# Patient Record
Sex: FEMALE | Race: BLACK OR AFRICAN AMERICAN | Employment: UNEMPLOYED | URBAN - METROPOLITAN AREA
[De-identification: names, ages, dates, MRNs, and addresses within clinical notes are randomized per-mention and may not be internally consistent; named-entity substitution may affect disease eponyms.]

---

## 2017-01-19 ENCOUNTER — OFFICE VISIT (OUTPATIENT)
Dept: PAIN MANAGEMENT | Age: 67
End: 2017-01-19

## 2017-01-19 VITALS — SYSTOLIC BLOOD PRESSURE: 137 MMHG | HEART RATE: 64 BPM | DIASTOLIC BLOOD PRESSURE: 71 MMHG

## 2017-01-19 DIAGNOSIS — G89.4 CHRONIC PAIN SYNDROME: Primary | ICD-10-CM

## 2017-01-19 DIAGNOSIS — M79.2 NEURALGIA AND NEURITIS: ICD-10-CM

## 2017-01-19 DIAGNOSIS — G62.9 PERIPHERAL POLYNEUROPATHY: ICD-10-CM

## 2017-01-19 DIAGNOSIS — I73.9 PERIPHERAL VASCULAR DISEASE (HCC): ICD-10-CM

## 2017-01-19 NOTE — PROGRESS NOTES
[facility] for Pain Management  Interventional Pain Management Consultation History & Physical    PATIENT NAME:  Gray Torres     YOB: 1950    DATE OF SERVICE:   1/19/2017      CHIEF COMPLAINT:  Leg Pain; Knee Pain; Foot Pain; and Joint Pain      HISTORY OF PRESENT ILLNESS:   Sirisha Marie presents to the pain clinic today for initial evaluation and to consider interventional pain management options as indicated for the type and location of the pain the patient is presenting with. Sirisha Marie patient presents for evaluation and consideration for interventional procedures as indicated. She is referred to us by Dr. Zeyad Faustin for evaluation and consideration for spinal cord stimulator trial and placement. At today's evaluation patient complains of chronic bilateral leg pain as well as knee and bilateral foot pain. She relates that she has history of peripheral neuropathy. She relates that she has a history of peripheral vascular disease and has had peripheral vascular stents placed in the back of both of her legs. Patient currently endorses her pain as burning shooting tingling aching and nagging throbbing pain of both of her legs essentially from the knees down. Patient also is noted to have severe venous insufficiency. She has been tried on Lyrica and gabapentin without benefit. She gets some benefit from oxycodone. Her pain is currently continuous and character distribution and quality of pain as noted above. Nothing seems to either aggravate her pain although she says it is constant but changes in weather and activity do tend to make pain worse. Pain is only moderately relieved with medications, and otherwise her pain persist.  She also endorses numbness and tingling of the legs difficulty walking. In addition the patient's peripheral neuropathy pain is worse at night.   She has associated restless feelings in both of her feet.     Patient had nerve conduction studies done at [facility] June 17, 2016. This is a abnormal study suggestive of lumbar radiculopathy or plexopathy. This is actually nerve conduction study, EMG was recommended as follow-up. Cervical spine AP and lateral done May 26, 2016 show anterior cervical disc fusion C6-7 with minimal lucency around the C6 screw. Recommended complete cervical spine series to further evaluate this. MRI of the C-spine without contrast Aysha 10, 2009 with impression mild degenerative disc disease most notably C4-5, C5-6, C6-7. Mild bony stenosis the left neural foramen C4-5. We discussed options. Patient presents a long-standing history of bilateral leg pain with particular distribution of pain from the knees down into the feet bilaterally. She endorses aching burning throbbing shooting tingling pain of both legs and feet. Pain is increased with walking as well as cold weather. She has a history of peripheral vascular insufficiency and peripheral vascular disease for which peripheral vascular stents have been placed with no benefit. She has history of peripheral neuropathy refractory to gabapentin and Lyrica. She is maintained on opioid regimen at present for pain control. Nerve conduction studies suggest abnormal study of both legs. EMGs have been recommended but refused. Patient is referred for possible spinal cord stimulator trial and placement. She is not on anticoagulants, nor aspirin or other antiplatelets or phosphodiesterase inhibitors. I am in agreement with Dr. Brent Jett and that I believe the patient may benefit from spinal cord stimulator trial and placement. I have given the patient spinal cord stimulator DVD. I have invited the patient to the next spinal cord stimulator meeting informational meeting February 14 at noontime her at the pain clinic. I will place a pain psychology consult with Dr. Elba Kirkland today. The patient has no further questions, once I have received her pain psychology consult I will go ahead and schedule her spinal cord stimulator trial.  I discussed the procedure with the patient today including risk and benefits, indications contraindications and side effects the procedure. I have used skeleton spine model as well as  spinal cord stimulator device to demonstrate this. She has no further questions. She wishes to proceed with trial.    MRI: Discussed imaging using spinal cord stimulator device    PROCEDURES: Discussed spinal cord trial and placement    MRI Results (most recent):  No results found for this or any previous visit. PAST MEDICAL HISTORY:   The patient  has a past medical history of Hearing reduced; Hepatitis; and Hypothyroid. PAST SURGICAL HISTORY:   The patient  has a past surgical history that includes gyn and orthopaedic (). CURRENT MEDICATIONS:   The patient has a current medication list which includes the following prescription(s): ropinirole, clonazepam, oxycodone ir, triamcinolone acetonide, valacyclovir, pregabalin, levothyroxine sodium, duloxetine hcl, ergocalciferol (vitamin d2), vit b cmplx 3-fa-vit c-biotin, oxycodone hcl, methadone, and zolpidem. ALLERGIES:     Allergies   Allergen Reactions    Demerol [Meperidine] Itching and Swelling       FAMILY HISTORY:   The patient family history is not on file. SOCIAL HISTORY:   The patient  reports that she has never smoked. She has never used smokeless tobacco. The patient  reports that she does not drink alcohol. She also  has no drug history on file.     REVIEW OF SYSTEMS:   The patient denies fever, chills, weight loss (Constitutional), rash, itching (Skin), tinnitus, congestion (HENT), blurred vision, photophobia (Eyes), palpitations, orthopnea (Cardiovascular), hemoptysis, wheezing (Respiratory), nausea, vomiting, diarrhea (Gastrointestinal), dysuria, hematuria, urgency (Genitourinary), easy bruising, bleeding abnormalities (Hematologic), bowel or bladder incontinence, loss of consciousness (Neurologic), suicidal or homicidal ideation or hallucinations (Psychiatric). PHYSICAL EXAM:  VS:   Visit Vitals    /71 (BP 1 Location: Left arm, BP Patient Position: Sitting)    Pulse 64     General: Well-developed and well-nourished. Body habitus consistent with recorded height and weight and the calculated BMI. Apparent distress due to chronic bilateral leg and foot pain. Head: Normocephalic, atraumatic. Skin: Inspection of the skin reveals no rashes, lesions or infection. CV: Regular rate. No murmurs or rubs noted. No peripheral edema noted. Pulm: Respirations are even and unlabored. Extr: No clubbing, cyanosis, or edema noted. Musculoskeletal:  1. Cervical spine -decreased ROM. No paraspinous tenderness at any level. There is no scoliosis, asymmetry, or musculoskeletal defect. 2. Thoracic spine - Full ROM. No paraspinous tenderness at any level. There is no scoliosis, asymmetry, or musculoskeletal defect. 3. Lumbar spine -decreased range of motion all axes. No paraspinous tenderness at any level. SI joints are nontender bilaterally. There is no scoliosis, asymmetry, or musculoskeletal defect. 4. Right upper extremity - Full ROM. 5/5 muscle strength in all muscle groups. No pain or tenderness in shoulder, elbow, wrist, or hand. 5. Left upper extremity - Full ROM. 5/5 muscle strength in all muscle groups. No pain or tenderness in shoulder, elbow, wrist, or hand. 6. Right lower extremity - Full ROM. 5/5 muscle strength in all muscle groups. Pain to light palpation essentially from the knees down involving knees calves and feet. Distal pulses poor but palpable. No excessive mottling, discoloration or other trophic changes noted of the feet. Moderate atrophy of the intrinsic muscles of the foot bilateral .     7. Left lower extremity - Full ROM.   5/5 muscle strength in all muscle groups. Neurological:  1. Mental Status - Alert, awake and oriented. Speech is clear and appropriate. 2. Cranial Nerves - Extraocular muscles intact bilaterally. Cranial nerves II-XII grossly intact bilaterally. 3. Gait - antalgic   4. Provocative Tests - Spurlings negative bilaterally. Straight leg raise negative bilaterally. Psychological:  1. Mood and affect - Appropriate. 2. Speech - Appropriate. 3. Though content - Appropriate. 4. Judgment - Appropriate. ASSESSMENT:      ICD-10-CM ICD-9-CM    1. Chronic pain syndrome G89.4 338.4    2. Peripheral polyneuropathy G62.9 356.9    3. Neuralgia and neuritis M79.2 729.2    4. Peripheral vascular disease (HCC) I73.9 443.9            PLAN:  We discussed options. Patient presents a long-standing history of bilateral leg pain with particular distribution of pain from the knees down into the feet bilaterally. She endorses aching burning throbbing shooting tingling pain of both legs and feet. Pain is increased with walking as well as cold weather. She has a history of peripheral vascular insufficiency and peripheral vascular disease for which peripheral vascular stents have been placed with no benefit. She has history of peripheral neuropathy refractory to gabapentin and Lyrica. She is maintained on opioid regimen at present for pain control. Nerve conduction studies suggest abnormal study of both legs. EMGs have been recommended but refused. Patient is referred for possible spinal cord stimulator trial and placement. She is not on anticoagulants, nor aspirin or other antiplatelets or phosphodiesterase inhibitors. I am in agreement with Dr. Dxion Laureano and that I believe the patient may benefit from spinal cord stimulator trial and placement. I have given the patient spinal cord stimulator DVD. I have invited the patient to the next spinal cord stimulator meeting informational meeting February 14 at noontime her at the pain clinic.   I will place a pain psychology consult with Dr. Juan Srinivasan today. The patient has no further questions, once I have received her pain psychology consult I will go ahead and schedule her spinal cord stimulator trial.  I discussed the procedure with the patient today including risk and benefits, indications contraindications and side effects the procedure. I have used skeleton spine model as well as  spinal cord stimulator device to demonstrate this. She has no further questions. She wishes to proceed with trial.  1.    Diagnoses/Plan: Chronic pain syndrome, peripheral neuropathy, neuralgia and neuritis, peripheral vascular disease. 2.    I have thoroughly discussed the risks and benefits, side effects and complications, of any and all procedures that were mentioned at today's patient visit. I have used a skeleton model for added emphasis and patient education. I have answered all questions, and I have obtained verbal confirmation for all procedures planned with the patient. 3.    I have reviewed in great detail today the patient's MRI and other imaging studies with the patient. I have explained to the patient their condition using both actual recent and relevant images. I have used a skeleton model for added emphasis as well as patient education. 4.    I have advised patient to have a primary care provider to continue care for health maintenance and general medical conditions and support for referral to specialty care as needed. 5.    I have reviewed with patient the treatment plan, goals of treatment plan, and limitations of treatment plan, to include the potential for side effects from medications and procedures. If side effects occur, it is the responsibility of the patient to inform the clinic so that a change in the treatment plan can be made in a safe manner.  The patient is advised that stopping prescribed medication may cause an increase in symptoms and possible medication withdrawal symptoms. The patient is informed an emergency room evaluation may be necessary if this occurs. DISPOSITION:   The patients condition and plan were discussed at length and all questions were answered. The patient agrees with the plan. A total of 40 minutes was spent with the patient of which over half of the time was spent counseling the patient. Josué Sweet MD 1/19/2017 2:10 PM    Note: Although these clinic notes were documented by the provider at the time of the exam, they have not been proofed and are subject to transcription variance.

## 2017-01-19 NOTE — MR AVS SNAPSHOT
Visit Information Date & Time Provider Department Dept. Phone Encounter #  
 1/19/2017  1:00 PM Sivan Chairez MD [facility] [phone] Your Appointments 2/3/2017  1:00 PM  
Office Visit with CFPM EDUC CLASS [facility] (STEFANIE SCHEDULING) Appt Note: new pt edu class; new pt edu class 30 [facility] [address]  
[phone]  
  
    
 2/3/2017  2:40 PM  
Follow Up with Ab Christie PA-C [facility] (STEFANIE SCHEDULING) Appt Note: Return in about 2 months (around 2/9/2017). ; Return in about 2 months (around 2/9/2017). 30 [facility] [address]  
[phone] [address] [place] [address] Upcoming Health Maintenance Date Due DTaP/Tdap/Td series (1 - Tdap) 9/25/1972 BREAST CANCER SCRN MAMMOGRAM 9/25/2001 ZOSTER VACCINE AGE 60> 9/25/2011 INFLUENZA AGE 9 TO ADULT 8/1/2016 GLAUCOMA SCREENING Q2Y 9/25/2016 Pneumococcal 65+ Low/Medium Risk (1 of 2 - PCV13) 9/25/2016 MEDICARE YEARLY EXAM 9/25/2016 COLONOSCOPY 11/7/2021 Allergies as of 1/19/2017  Review Complete On: 1/04/5984 By: Araceli Gallo LPN Severity Noted Reaction Type Reactions Demerol [Meperidine]  08/01/2012    Itching, Swelling Current Immunizations  Never Reviewed No immunizations on file. Not reviewed this visit Vitals BP Pulse OB Status Smoking Status 137/71 (BP 1 Location: Left arm, BP Patient Position: Sitting) 64 Hysterectomy Never Smoker Vitals History Preferred Pharmacy Pharmacy Name Phone 1133 [facility], [address] [address] [phone] Your Updated Medication List  
  
   
This list is accurate as of: 1/19/17  1:38 PM.  Always use your most recent med list.  
  
  
  
  
 AMBIEN 10 mg tablet Generic drug:  zolpidem Take  by mouth nightly as needed. CYMBALTA PO Take  by mouth. KlonoPIN 1 mg tablet Generic drug:  clonazePAM  
Take  by mouth two (2) times a day. LYRICA PO Take  by mouth.  
  
 methadone 10 mg tablet Commonly known as:  DOLOPHINE Take  by mouth every four (4) hours as needed. * OXYCODONE PO Take  by mouth. * oxyCODONE IR 30 mg immediate release tablet Commonly known as:  OXY-IR Take 1 Tab by mouth three (3) times daily as needed for Pain for up to 30 days. Max Daily Amount: 90 mg. Indications: PAIN  
  
 rOPINIRole 1 mg tablet Commonly known as:  [address] Take  by mouth three (3) times daily. SYNTHROID PO Take  by mouth.  
  
 triamcinolone acetonide 0.1 % topical cream  
Commonly known as:  KENALOG Apply  to affected area four (4) times daily as needed for Skin Irritation. use thin layer  
  
 valACYclovir 1 gram tablet Commonly known as:  VALTREX Take 1 Tab by mouth daily. vit B Cmplx 3-FA-Vit C-Biotin 1- mg-mg-mcg tablet Commonly known as:  NEPHRO FRANKY RX Take 1 Tab by mouth daily. VITAMIN D2 PO Take  by mouth. * Notice: This list has 2 medication(s) that are the same as other medications prescribed for you. Read the directions carefully, and ask your doctor or other care provider to review them with you. Introducing [facility] & HEALTH SERVICES! [employer] introduces [employer] patient portal. Now you can access parts of your medical record, email your doctor's office, and request medication refills online. 1. In your internet browser, go to [email or web address] 2. Click on the First Time User? Click Here link in the Sign In box. You will see the New Member Sign Up page. 3. Enter your [employer] Access Code exactly as it appears below. You will not need to use this code after youve completed the sign-up process.  If you do not sign up before the expiration date, you must request a new code. · [employer] Access Code: [email or web address] Expires: 3/7/2017  3:47 PM 
 
4. Enter the last four digits of your Social Security Number (xxxx) and Date of Birth (mm/dd/yyyy) as indicated and click Submit. You will be taken to the next sign-up page. 5. Create a [employer] ID. This will be your Sym[employer] login ID and cannot be changed, so think of one that is secure and easy to remember. 6. Create a [employer] password. You can change your password at any time. 7. Enter your Password Reset Question and Answer. This can be used at a later time if you forget your password. 8. Enter your e-mail address. You will receive e-mail notification when new information is available in [address] 9. Click Sign Up. You can now view and download portions of your medical record. 10. Click the Download Summary menu link to download a portable copy of your medical information. If you have questions, please visit the Frequently Asked Questions section of the [employer] website. Remember, [employer] is NOT to be used for urgent needs. For medical emergencies, dial 911. Now available from your iPhone and Android! Please provide this summary of care documentation to your next provider. Your primary care clinician is listed as Tu Hirsch. If you have any questions after today's visit, please call [phone].

## 2017-02-03 ENCOUNTER — OFFICE VISIT (OUTPATIENT)
Dept: PAIN MANAGEMENT | Age: 67
End: 2017-02-03

## 2017-02-03 VITALS — BODY MASS INDEX: 28.84 KG/M2 | WEIGHT: 168 LBS

## 2017-02-03 DIAGNOSIS — Z98.890 STATUS POST CARPAL TUNNEL RELEASE: ICD-10-CM

## 2017-02-03 DIAGNOSIS — M79.2 NEURALGIA AND NEURITIS: ICD-10-CM

## 2017-02-03 DIAGNOSIS — Z90.710 S/P TOTAL ABDOMINAL HYSTERECTOMY: ICD-10-CM

## 2017-02-03 DIAGNOSIS — G89.4 CHRONIC PAIN SYNDROME: ICD-10-CM

## 2017-02-03 DIAGNOSIS — I73.9 PERIPHERAL VASCULAR DISEASE (HCC): ICD-10-CM

## 2017-02-03 DIAGNOSIS — G62.9 PERIPHERAL POLYNEUROPATHY: Primary | ICD-10-CM

## 2017-02-03 RX ORDER — OXYCODONE HYDROCHLORIDE 30 MG/1
30 TABLET ORAL
Qty: 90 TAB | Refills: 0 | Status: SHIPPED | OUTPATIENT
Start: 2017-03-09 | End: 2017-03-31 | Stop reason: SDUPTHER

## 2017-02-03 RX ORDER — OXYCODONE HYDROCHLORIDE 30 MG/1
30 TABLET ORAL
Qty: 90 TAB | Refills: 0 | Status: SHIPPED | OUTPATIENT
Start: 2017-02-08 | End: 2017-02-03 | Stop reason: SDUPTHER

## 2017-02-03 NOTE — PATIENT INSTRUCTIONS
Plan:  Continue same medications as prescribed for chronic pain  Do not take any medications not prescribed to you for any reason  Report all controlled medications that are prescribed by other prescribers  Follow up in 2 months or sooner if needed  Regular exercise and attention to emotional health and diet remain the most effective ways to treat chronic pain of all kinds  You may contact me with questions or concerns through [address]

## 2017-02-03 NOTE — MR AVS SNAPSHOT
Visit Information Date & Time Provider Department Dept. Phone Encounter #  
 2/3/2017  2:40 PM Odalis Meeksani [address] for [facility] Management [phone] [id number] Follow-up Instructions Return in about 2 months (around 4/3/2017). Follow-up and Disposition History Your Appointments 2/3/2017  2:40 PM  
Follow Up with Gonzalez Yao PA-C [address] [facility] (STEFANIE SCHEDULING) Appt Note: Return in about 2 months (around 2/9/2017). ; Return in about 2 months (around 2/9/2017). 30 [facility] [address]  
[phone]  [id number]  
  
    
 2/9/2017  1:40 PM  
Follow Up with Gonzalez Yao PA-C [address] for [facility] (STEFANIE SCHEDULING) Appt Note: FOLLOW UP  
 30 [facility] [address]  
[phone] Upcoming Health Maintenance Date Due DTaP/Tdap/Td series (1 - Tdap) 9/25/1972 BREAST CANCER SCRN MAMMOGRAM 9/25/2001 ZOSTER VACCINE AGE 60> 9/25/2011 INFLUENZA AGE 9 TO ADULT 8/1/2016 GLAUCOMA SCREENING Q2Y 9/25/2016 Pneumococcal 65+ Low/Medium Risk (1 of 2 - PCV13) 9/25/2016 MEDICARE YEARLY EXAM 9/25/2016 COLONOSCOPY 11/7/2021 Allergies as of 2/3/2017  Review Complete On: 2/3/2017 By: Edi Dawn LPN Severity Noted Reaction Type Reactions Demerol [Meperidine]  08/01/2012    Itching, Swelling Current Immunizations  Never Reviewed No immunizations on file. Not reviewed this visit You Were Diagnosed With   
  
 Codes Comments Peripheral polyneuropathy    -  Primary ICD-10-CM: G62.9 ICD-9-CM: 356.9 Neuralgia and neuritis     ICD-10-CM: M79.2 ICD-9-CM: 729.2 S/P total abdominal hysterectomy     ICD-10-CM: Z90.710 ICD-9-CM: V88.01 Status post carpal tunnel release     ICD-10-CM: A88.647 ICD-9-CM: V45.89   
 Peripheral vascular disease ([facility] [address]     ICD-10-CM: I73.9 ICD-9-CM: 443. 9 Chronic pain syndrome     ICD-10-CM: G89.4 ICD-9-CM: 338. 4 Vitals Weight(growth percentile) BMI OB Status Smoking Status 168 lb (76.2 kg) 28.84 kg/m2 Hysterectomy Never Smoker BMI and BSA Data Body Mass Index Body Surface Area  
 28.84 kg/m 2 1.85 m 2 Preferred Pharmacy Pharmacy Name Phone [address] [phone] Your Updated Medication List  
  
   
This list is accurate as of: 2/3/17  1:52 PM.  Always use your most recent med list.  
  
  
  
  
 AMBIEN 10 mg tablet Generic drug:  zolpidem Take  by mouth nightly as needed. CYMBALTA PO Take  by mouth. KlonoPIN 1 mg tablet Generic drug:  clonazePAM  
Take  by mouth two (2) times a day. LYRICA PO Take  by mouth.  
  
 methadone 10 mg tablet Commonly known as:  DOLOPHINE Take  by mouth every four (4) hours as needed. * OXYCODONE PO Take  by mouth. * oxyCODONE IR 30 mg immediate release tablet Commonly known as:  OXY-IR Take 1 Tab by mouth three (3) times daily as needed for Pain for up to 30 days. Max Daily Amount: 90 mg. Indications: Pain Start taking on:  3/9/2017  
  
 rOPINIRole 1 mg tablet Commonly known as:  [place] [address] Take  by mouth three (3) times daily. SYNTHROID PO Take  by mouth.  
  
 triamcinolone acetonide 0.1 % topical cream  
Commonly known as:  KENALOG Apply  to affected area four (4) times daily as needed for Skin Irritation. use thin layer  
  
 valACYclovir 1 gram tablet Commonly known as:  VALTREX Take 1 Tab by mouth daily. vit B Cmplx 3-FA-Vit C-Biotin 1- mg-mg-mcg tablet Commonly known as:  NEPHRO FRANKY RX Take 1 Tab by mouth daily. VITAMIN D2 PO Take  by mouth. * Notice:   This list has 2 medication(s) that are the same as other medications prescribed for you. Read the directions carefully, and ask your doctor or other care provider to review them with you. Prescriptions Printed Refills  
 oxyCODONE IR (OXY-IR) 30 mg immediate release tablet 0 Starting on: 3/9/2017 Sig: Take 1 Tab by mouth three (3) times daily as needed for Pain for up to 30 days. Max Daily Amount: 90 mg. Indications: Pain Class: Print Route: Oral  
  
Follow-up Instructions Return in about 2 months (around 4/3/2017). Patient Instructions Plan: 
Continue same medications as prescribed for chronic pain Do not take any medications not prescribed to you for any reason Report all controlled medications that are prescribed by other prescribers Follow up in 2 months or sooner if needed Regular exercise and attention to emotional health and diet remain the most effective ways to treat chronic pain of all kinds You may contact me with questions or concerns through [address] Introducing hospitals & [facility] SERVICES! [facility] introduces [employer] patient portal. Now you can access parts of your medical record, email your doctor's office, and request medication refills online. 1. In your internet browser, go to [email or web address] 2. Click on the First Time User? Click Here link in the Sign In box. You will see the New Member Sign Up page. 3. Enter your [employer] Access Code exactly as it appears below. You will not need to use this code after youve completed the sign-up process. If you do not sign up before the expiration date, you must request a new code. · [employer] Access Code: [email or web address] Expires: 3/7/2017  3:47 PM 
 
4. Enter the last four digits of your Social Security Number (xxxx) and Date of Birth (mm/dd/yyyy) as indicated and click Submit. You will be taken to the next sign-up page. 5. Create a [employer] ID.  This will be your [employer] login ID and cannot be changed, so think of one that is secure and easy to remember. 6. Create a [employer] password. You can change your password at any time. 7. Enter your Password Reset Question and Answer. This can be used at a later time if you forget your password. 8. Enter your e-mail address. You will receive e-mail notification when new information is available in [address] 9. Click Sign Up. You can now view and download portions of your medical record. 10. Click the Download Summary menu link to download a portable copy of your medical information. If you have questions, please visit the Frequently Asked Questions section of the [employer] website. Remember, [employer] is NOT to be used for urgent needs. For medical emergencies, dial 911. Now available from your iPhone and Android! Please provide this summary of care documentation to your next provider. Your primary care clinician is listed as Tu Hirsch. If you have any questions after today's visit, please call [phone].

## 2017-02-03 NOTE — PROGRESS NOTES
Nursing Notes    Patient presents to the office today in follow-up. Patient rates her pain at 3/10 on the numerical pain scale. Reviewed medications with counts as follows:    Rx Date filled Qty Dispensed Pill Count Last Dose Short   Oxycodone 30mg 01/10/17 90 31 Today  No                                             Comments:     POC UDS was not performed in office today    Any new labs or imaging since last appointment? YES; cxr     Have you been to an emergency room (ER) or urgent care clinic since your last visit? NO            Have you been hospitalized since your last visit? NO     If yes, where, when, and reason for visit? Have you seen or consulted any other health care providers outside of the [address] [place] [address]  since your last visit? YES     If yes, where, when, and reason for visit? Orthopedist     HM deferred to pcp.

## 2017-02-03 NOTE — PROGRESS NOTES
HISTORY OF PRESENT ILLNESS  Sirisha Marie is a 73 y.o. female. Patient presents for follow up of chronic, severe pain which is widespread and multifocal and includes peripheral neuropathic pain and polyarthralgias. She also suffers with chronic neck pain due to cervical DDD and postlaminectomy syndrome. She reports that pain continues to predominate in the knees, lower legs, and feet. Knee pain is described as sharp, stiff, and aching. Pain worsens with rising from a seated position and with prolonged walking. She received cortisone injections yesterday, but has seen no improvement yet. Neuropathy pain is described as shooting, tingling, and burning. This pain also worsens with prolonged walking, and with prolonged sitting (particularly in the evenings). Peripheral arterial disease worsens this pain, and she also notes cramping in the calves with prolonged walking. She reports that she has spoken with a vascular surgeon who has recommended bypass grafting if claudication symptoms worsen. She has been taking Oxycodone 30 mg TID for over two years, which she states is the most effective and best tolerated of anything she has tried. Constipation is problematic at times, but is well managed with daily miralax and sennakot. Sirisha Marie is able to stay more active with less discomfort with these current doses. The patient reports an average of 80 % relief with this regimen. Most recent  were consistent with prescribed medications. However, UDS showed a small amount of Codeine and Norfentanyl metabolites. She denies taking either of these medications. We will perform a repeat UDS today. Pill counts are appropriate. She is informed of side effects, risks, and benefits of this regimen, and emphasizes that she derives a significant improvement in functionality and quality of life, and notes that non-opioid medications and therapies in the past have not offered significant benefit.    She denies new or worsening insomnia or constipation issues. She denies any falls, injuries, or hospitalizations since the last visit. She uses Ambien for sleep, and reports that this has been effective and well tolerated. Klonopine 0.5 mg is prescribed for anxiety by her psychiatrist, and she reports that she has discussed risks with her. We discussed the risks of disordered breathing and overdose with combining benzos with opioids, particularly at her high doses. She accepts these risks, noting that her functionality and quality of life has improved with this regimen. A total of 45 minutes was spent with the patient of which more than 50% of the time was spent counseling the patient. HPI--see above    ROS  Constitutional: Positive for malaise/fatigue. Gastrointestinal: Positive for constipation, heartburn and nausea. Musculoskeletal: Positive for back pain, joint pain (polyarticular), myalgias and neck pain. Neurological: Positive for weakness (generalized). Psychiatric/Behavioral: The patient has insomnia. All other systems reviewed and are negative. Physical Exam  Constitutional: She is oriented to person, place, and time. She appears cachectic. She has a sickly appearance. She appears distressed. HENT:   Head: Normocephalic and atraumatic. Right Ear: External ear normal.   Left Ear: External ear normal.   Nose: Nose normal.   Mouth/Throat: Oropharynx is clear and moist. No oropharyngeal exudate. Eyes: Conjunctivae and EOM are normal. Pupils are equal, round, and reactive to light. Right eye exhibits no discharge. Left eye exhibits no discharge. No scleral icterus. Neck: Muscular tenderness: spasm. Decreased range of motion present. No Brudzinski's sign and no Kernig's sign noted. No thyromegaly present. Cardiovascular: Normal rate, regular rhythm and normal heart sounds. Pulmonary/Chest: Effort normal and breath sounds normal. No respiratory distress. She has no wheezes. She has no rales.    Abdominal: Soft. She exhibits no distension. There is no tenderness. There is no rebound and no guarding. Musculoskeletal: She exhibits tenderness. Right shoulder: She exhibits decreased range of motion and tenderness. Left shoulder: She exhibits decreased range of motion and tenderness. Right elbow: She exhibits decreased range of motion. Tenderness found. Left elbow: She exhibits decreased range of motion. Tenderness found. Right wrist: She exhibits decreased range of motion and tenderness. Left wrist: She exhibits decreased range of motion and tenderness. Right hip: She exhibits decreased range of motion and tenderness. Left hip: She exhibits decreased range of motion and tenderness. Right knee: She exhibits decreased range of motion. Tenderness found. Left knee: She exhibits decreased range of motion. Tenderness found. Right ankle: She exhibits decreased range of motion. Tenderness. Left ankle: She exhibits decreased range of motion. Tenderness. Lumbar back: She exhibits decreased range of motion, tenderness, pain and spasm. Neurological: She is alert and oriented to person, place, and time. She has normal reflexes. No cranial nerve deficit or sensory deficit. She exhibits normal muscle tone. Gait abnormal. Coordination normal.   Reflex Scores:       Tricep reflexes are 2+ on the right side and 2+ on the left side. Bicep reflexes are 2+ on the right side and 2+ on the left side. Brachioradialis reflexes are 2+ on the right side and 2+ on the left side. Patellar reflexes are 2+ on the right side and 2+ on the left side. Achilles reflexes are 2+ on the right side and 2+ on the left side. Skin: Skin is warm. No rash noted. Psychiatric: Her speech is normal and behavior is normal. Judgment and thought content normal. She exhibits a depressed mood. ASSESSMENT and PLAN    ICD-10-CM ICD-9-CM    1.  Peripheral polyneuropathy G62.9 356.9    2. Neuralgia and neuritis M79.2 729.2    3. S/P total abdominal hysterectomy-1993 Z90.710 V88.01    4. Status post carpal tunnel release-bilaterally Z98.890 V45.89    5. Peripheral vascular disease (HCC) I73.9 443.9    6.  Chronic pain syndrome G89.4 338.4       Plan:  Continue same medications as prescribed for chronic pain  Do not take any medications not prescribed to you for any reason  Report all controlled medications that are prescribed by other prescribers  Follow up in 2 months or sooner if needed  Regular exercise and attention to emotional health and diet remain the most effective ways to treat chronic pain of all kinds  You may contact me with questions or concerns through [employer]

## 2017-02-22 ENCOUNTER — TELEPHONE (OUTPATIENT)
Dept: PAIN MANAGEMENT | Age: 67
End: 2017-02-22

## 2017-02-22 NOTE — TELEPHONE ENCOUNTER
Called patient to [facility] base after hearing from Dr. Jasper Collins office that patient missed her appointment. Patient stated that she believed she had cancelled the appointment because she was told the device that was being offered was strictly for her back pain and she needs help with her neck pain. Patient states that she has a surgery scheduled with another doctor for march 21st  To correct the cause of her neck pain. Spent 25 minutes explaining to patient the purpose and the mechanics of the SCS. Patient stated that no one has taken the time to explain the procedure or device to her before now and now she feels she wants to reconsider getting the device. Patient was urged to attended the SCS class being held in our office on 3/15/2017 @ 12pm, it was also stated to patient that if she would like to bring a family member or friend for moral support to feel free to do so, in order to get all of her questions answered and be well informed on what it is that we are offering her. Patient stated that she would like to cancel her upcoming surgery and attend the class. Advised patient to hold off on cancelling her surgery until after she attends the class and decides if the SCS is something she wishes to continue with. Direct contact information provided. Patient thanked me and call was ended.

## 2017-03-01 ENCOUNTER — HOSPITAL ENCOUNTER (OUTPATIENT)
Dept: PREADMISSION TESTING | Age: 67
Discharge: HOME | End: 2017-03-01
Payer: COMMERCIAL

## 2017-03-01 DIAGNOSIS — M50.121 DISORDER OF INTERVERTEBRAL DISC AT C4-C5 LEVEL WITH RADICULOPATHY: ICD-10-CM

## 2017-03-01 LAB
ANION GAP BLD CALC-SCNC: 6 MMOL/L (ref 3–18)
ATRIAL RATE: 61 BPM
BACTERIA SPEC CULT: NORMAL
BUN SERPL-MCNC: 12 MG/DL (ref 7–18)
BUN/CREAT SERPL: 15 (ref 12–20)
CALCIUM SERPL-MCNC: 9.2 MG/DL (ref 8.5–10.1)
CALCULATED P AXIS, ECG09: 28 DEGREES
CALCULATED R AXIS, ECG10: 63 DEGREES
CALCULATED T AXIS, ECG11: 62 DEGREES
CHLORIDE SERPL-SCNC: 104 MMOL/L (ref 100–108)
CO2 SERPL-SCNC: 31 MMOL/L (ref 21–32)
CREAT SERPL-MCNC: 0.8 MG/DL (ref 0.6–1.3)
DIAGNOSIS, 93000: NORMAL
ERYTHROCYTE [DISTWIDTH] IN BLOOD BY AUTOMATED COUNT: 12.4 % (ref 11.6–14.5)
GLUCOSE SERPL-MCNC: 99 MG/DL (ref 74–99)
HCT VFR BLD AUTO: 42.6 % (ref 35–45)
HGB BLD-MCNC: 14.5 G/DL (ref 12–16)
MCH RBC QN AUTO: 32.6 PG (ref 24–34)
MCHC RBC AUTO-ENTMCNC: 34 G/DL (ref 31–37)
MCV RBC AUTO: 95.7 FL (ref 74–97)
P-R INTERVAL, ECG05: 116 MS
PLATELET # BLD AUTO: 167 K/UL (ref 135–420)
PMV BLD AUTO: 10.1 FL (ref 9.2–11.8)
POTASSIUM SERPL-SCNC: 4.5 MMOL/L (ref 3.5–5.5)
Q-T INTERVAL, ECG07: 424 MS
QRS DURATION, ECG06: 86 MS
QTC CALCULATION (BEZET), ECG08: 426 MS
RBC # BLD AUTO: 4.45 M/UL (ref 4.2–5.3)
SERVICE CMNT-IMP: NORMAL
SODIUM SERPL-SCNC: 141 MMOL/L (ref 136–145)
VENTRICULAR RATE, ECG03: 61 BPM
WBC # BLD AUTO: 7.5 K/UL (ref 4.6–13.2)

## 2017-03-01 PROCEDURE — 87641 MR-STAPH DNA AMP PROBE: CPT | Performed by: ORTHOPAEDIC SURGERY

## 2017-03-01 PROCEDURE — 36415 COLL VENOUS BLD VENIPUNCTURE: CPT | Performed by: ORTHOPAEDIC SURGERY

## 2017-03-01 PROCEDURE — 85027 COMPLETE CBC AUTOMATED: CPT | Performed by: ORTHOPAEDIC SURGERY

## 2017-03-01 PROCEDURE — 93005 ELECTROCARDIOGRAM TRACING: CPT

## 2017-03-01 PROCEDURE — 80048 BASIC METABOLIC PNL TOTAL CA: CPT | Performed by: ORTHOPAEDIC SURGERY

## 2017-03-15 PROBLEM — M48.02 CERVICAL SPINAL STENOSIS: Status: ACTIVE | Noted: 2017-03-15

## 2017-03-15 PROBLEM — M50.30 DDD (DEGENERATIVE DISC DISEASE), CERVICAL: Status: ACTIVE | Noted: 2017-03-15

## 2017-03-15 PROBLEM — M50.20 HNP (HERNIATED NUCLEUS PULPOSUS), CERVICAL: Status: ACTIVE | Noted: 2017-03-15

## 2017-03-16 NOTE — H&P
Patient Name:   Gigi Gonzalez  YOB: 1950      Chief Complaint:  Neck pain. History of Chief Complaint:  Ms. Amanda Carreon is a 73 y.o female being seen for neck pain. She says her neck pain seems to be getting worse. There seems to be soreness across her upper neck and into her shoulders. She has no weakness and no bowel or bladder dysfunction. Bending her neck backward causes pain. Past Medical/Surgical History:    Disease/Disorder Type Date Side Surgery Date Side Comment   Depression          Osteoarthritis          Osteoporosis          Thyroid disease          Hepatitis C          Schambergs disease              Hysterectomy, total   AMD 11/06/2014 -       Spinal fusion, cervical 06/2009 C6-7        Carpal tunnel release 2005 right        Carpal tunnel release 2006 left    Peripheral neuropathy    Leg stents 2012 bilateral      Allergies:    Ingredient Reaction Medication Name Comment   Demerol  Incivek          Current Medications:    Medication Directions   Synthroid    zolpidem 10 mg tablet    valacyclovir 1 g tablet    triamcinolone acetonide 0.1 % topical cream      Social History:      SMOKING  Status Tobacco Type Units Per Day Yrs Used   Never smoker        ALCOHOL  There is no history of alcohol use. Family History:    Disease Detail Family Member Age Cause of Death Comments   Family history of Arthritis   N    Renal disease Mother  N    Stroke Mother  N    Renal disease Father  Y    Family history of Thyroid disorder   N    Family history of Cardiovascular disease   N    Diabetes mellitus Mother  N    Hypertension Mother  N    Myocardial infarction Brother  N      Vitals:  Date BP Pulse Temp (F) Resp. (per min.) Height (Total in.) Weight (lbs.) BMI   05/12/2015 130/76 66   65.00  29.12   10/30/2014 126/70 73   64.00  27.98   06/16/2014 128/73 90   64.00  27.98     Physical Examination:    General:  The patient appears healthy.   Cardiovascular:  Arterial pulses are normal.  Skin:  The skin is normal appearing with no contusions or wounds noted. Heart- RRR  Lungs-CTA alta  Abdomen- +BS,soft,nontender  Musculoskeletal:  There is tenderness of the paravertebral spinal muscles. Otherwise, the cervical spine has a normal appearance and no spasm of the paracervical muscle. There is no tenderness on palpation of the trapezius muscle. Flexion, extension, and right and left rotation of the cervical spine are normal.  Examination of the shoulder shows no warmth, no deformity, and no muscle atrophy. There is no tenderness on palpation of the subacromial bursa, the glenohumeral joint region, or the bicipital groove. Range of motion of the shoulder is normal in abduction, forward flexion, extension, and in internal and external rotation. No pain is elicited by motion of the shoulder or by impingement testing. No instability of the shoulder is noted. Neurological:  Sensory and motor examination of the cervical spine elicited no tactile dysesthesia or hyperesthesia and demonstrated normal motor strength of the upper extremities. Shoulder strength is normal in flexion, abduction, adduction, and internal rotation. Reflexes and peripheral nerves are intact. Normal reflexes are present in the biceps, brachioradialis, and triceps. There is no weakness in the fingers. Gait and stance are normal.  Knee and ankle jerks are normal with no clonus. Radiograph Examination: AP, lateral, bilateral oblique, flexion and extension views of the cervical spine were obtained and interpreted in the office 1/26/17and reveal excellent alignment of the cervical spine hardware and anatomy. Review of her MRI scan  [facility] 2/6/17 reveals C4-5 and C5-6 disc herniation and spinal stenosis.     Impression:  Ms. Safia Almendarez and I discussed treatments for her cervical spinal stenosis, degenerative disc disease, and disc herniation including surgical intervention, the risks, and benefits as well as the different surgical approaches and decision making. We also discussed nonsurgical care for this condition including medications, injections, physical therapy, rehabilitation, activity modification, and brace utilization. At this point, she would like to proceed with operative intervention. We will plan for C4-5 and C5-6 ACDF  with Zero Profile. The risks versus the benefits as well as the alternatives were fully explained to the patient. Risks include, but are not limited to, paralysis, death, heart attack, stroke, pulmonary embolism, deep vein thrombosis, infection, failure to relieve pain, increase in back or arm pain, reherniation, scarring, spinal fluid leak, bowel or bladder dysfunction, bleeding, disease transmission, instrumentation failure, pseudarthrosis, difficulty swallowing, and need for revision surgery. The patient states full understanding of the risks and benefits and wishes to proceed.

## 2017-03-20 ENCOUNTER — ANESTHESIA EVENT (OUTPATIENT)
Dept: SURGERY | Age: 67
DRG: 473 | End: 2017-03-20
Payer: COMMERCIAL

## 2017-03-21 ENCOUNTER — SURGERY (OUTPATIENT)
Age: 67
End: 2017-03-21

## 2017-03-21 ENCOUNTER — HOSPITAL ENCOUNTER (INPATIENT)
Age: 67
LOS: 1 days | Discharge: HOME HEALTH | DRG: 473 | End: 2017-03-22
Attending: ORTHOPAEDIC SURGERY | Admitting: ORTHOPAEDIC SURGERY
Payer: COMMERCIAL

## 2017-03-21 ENCOUNTER — APPOINTMENT (OUTPATIENT)
Dept: GENERAL RADIOLOGY | Age: 67
DRG: 473 | End: 2017-03-21
Attending: ORTHOPAEDIC SURGERY
Payer: COMMERCIAL

## 2017-03-21 ENCOUNTER — ANESTHESIA (OUTPATIENT)
Dept: SURGERY | Age: 67
DRG: 473 | End: 2017-03-21
Payer: COMMERCIAL

## 2017-03-21 PROCEDURE — 77030013079 HC BLNKT BAIR HGGR 3M -A: Performed by: ANESTHESIOLOGY

## 2017-03-21 PROCEDURE — 76010000153 HC OR TIME 1.5 TO 2 HR: Performed by: ORTHOPAEDIC SURGERY

## 2017-03-21 PROCEDURE — 77030003029 HC SUT VCRL J&J -B: Performed by: ORTHOPAEDIC SURGERY

## 2017-03-21 PROCEDURE — 76210000016 HC OR PH I REC 1 TO 1.5 HR: Performed by: ORTHOPAEDIC SURGERY

## 2017-03-21 PROCEDURE — 77030003666 HC NDL SPINAL BD -A: Performed by: ORTHOPAEDIC SURGERY

## 2017-03-21 PROCEDURE — 77030016293 HC SPCR SPN ZEROP SYNT -I2: Performed by: ORTHOPAEDIC SURGERY

## 2017-03-21 PROCEDURE — 77030018836 HC SOL IRR NACL ICUM -A: Performed by: ORTHOPAEDIC SURGERY

## 2017-03-21 PROCEDURE — 74011250636 HC RX REV CODE- 250/636: Performed by: ANESTHESIOLOGY

## 2017-03-21 PROCEDURE — 77030013567 HC DRN WND RESERV BARD -A: Performed by: ORTHOPAEDIC SURGERY

## 2017-03-21 PROCEDURE — 77030002986 HC SUT PROL J&J -A: Performed by: ORTHOPAEDIC SURGERY

## 2017-03-21 PROCEDURE — 97161 PT EVAL LOW COMPLEX 20 MIN: CPT

## 2017-03-21 PROCEDURE — 74011250636 HC RX REV CODE- 250/636: Performed by: PHYSICIAN ASSISTANT

## 2017-03-21 PROCEDURE — 77030032490 HC SLV COMPR SCD KNE COVD -B: Performed by: ORTHOPAEDIC SURGERY

## 2017-03-21 PROCEDURE — 77030010507 HC ADH SKN DERMBND J&J -B: Performed by: ORTHOPAEDIC SURGERY

## 2017-03-21 PROCEDURE — 0RG20A0 FUSION OF 2 OR MORE CERVICAL VERTEBRAL JOINTS WITH INTERBODY FUSION DEVICE, ANTERIOR APPROACH, ANTERIOR COLUMN, OPEN APPROACH: ICD-10-PCS | Performed by: ORTHOPAEDIC SURGERY

## 2017-03-21 PROCEDURE — 77030012406 HC DRN WND PENRS BARD -A: Performed by: ORTHOPAEDIC SURGERY

## 2017-03-21 PROCEDURE — 77030008683 HC TU ET CUF COVD -A: Performed by: ANESTHESIOLOGY

## 2017-03-21 PROCEDURE — 65270000029 HC RM PRIVATE

## 2017-03-21 PROCEDURE — 77030020782 HC GWN BAIR PAWS FLX 3M -B: Performed by: ORTHOPAEDIC SURGERY

## 2017-03-21 PROCEDURE — 0RT30ZZ RESECTION OF CERVICAL VERTEBRAL DISC, OPEN APPROACH: ICD-10-PCS | Performed by: ORTHOPAEDIC SURGERY

## 2017-03-21 PROCEDURE — 77030031588 HC SPCR SPN ZEROP VA SYNT -I: Performed by: ORTHOPAEDIC SURGERY

## 2017-03-21 PROCEDURE — 77030006643: Performed by: ANESTHESIOLOGY

## 2017-03-21 PROCEDURE — 74011250636 HC RX REV CODE- 250/636

## 2017-03-21 PROCEDURE — 77030011267 HC ELECTRD BLD COVD -A: Performed by: ORTHOPAEDIC SURGERY

## 2017-03-21 PROCEDURE — 74011250636 HC RX REV CODE- 250/636: Performed by: ORTHOPAEDIC SURGERY

## 2017-03-21 PROCEDURE — 74011000250 HC RX REV CODE- 250

## 2017-03-21 PROCEDURE — 77030008462 HC STPLR SKN PROX J&J -A: Performed by: ORTHOPAEDIC SURGERY

## 2017-03-21 PROCEDURE — C1713 ANCHOR/SCREW BN/BN,TIS/BN: HCPCS | Performed by: ORTHOPAEDIC SURGERY

## 2017-03-21 PROCEDURE — 77030011640 HC PAD GRND REM COVD -A: Performed by: ORTHOPAEDIC SURGERY

## 2017-03-21 PROCEDURE — 74011000250 HC RX REV CODE- 250: Performed by: ORTHOPAEDIC SURGERY

## 2017-03-21 PROCEDURE — 77030004402 HC BUR NEUR STRY -C: Performed by: ORTHOPAEDIC SURGERY

## 2017-03-21 PROCEDURE — 77030008477 HC STYL SATN SLP COVD -A: Performed by: ANESTHESIOLOGY

## 2017-03-21 PROCEDURE — 76060000034 HC ANESTHESIA 1.5 TO 2 HR: Performed by: ORTHOPAEDIC SURGERY

## 2017-03-21 PROCEDURE — 77010033678 HC OXYGEN DAILY

## 2017-03-21 PROCEDURE — 0RG20K0 FUSION OF 2 OR MORE CERVICAL VERTEBRAL JOINTS WITH NONAUTOLOGOUS TISSUE SUBSTITUTE, ANTERIOR APPROACH, ANTERIOR COLUMN, OPEN APPROACH: ICD-10-PCS | Performed by: ORTHOPAEDIC SURGERY

## 2017-03-21 PROCEDURE — 74011000272 HC RX REV CODE- 272: Performed by: ORTHOPAEDIC SURGERY

## 2017-03-21 PROCEDURE — 74011250637 HC RX REV CODE- 250/637: Performed by: PHYSICIAN ASSISTANT

## 2017-03-21 DEVICE — IMPLANTABLE DEVICE: Type: IMPLANTABLE DEVICE | Site: SPINE CERVICAL | Status: FUNCTIONAL

## 2017-03-21 DEVICE — SPACER SPNL ZERO-P VA IMPL 8MM LORDOTIC STERILE: Type: IMPLANTABLE DEVICE | Site: SPINE CERVICAL | Status: FUNCTIONAL

## 2017-03-21 DEVICE — SCREW SPNL L14MM DIA3.7MM G ANT CERV TI SELF DRL ZERO-P VA: Type: IMPLANTABLE DEVICE | Site: SPINE CERVICAL | Status: FUNCTIONAL

## 2017-03-21 DEVICE — GRAFT BNE SUB M GRN CA CRBNT PTTY INJ RESRB SIGNAFUSE: Type: IMPLANTABLE DEVICE | Site: SPINE CERVICAL | Status: FUNCTIONAL

## 2017-03-21 RX ORDER — HYDROMORPHONE HYDROCHLORIDE 1 MG/ML
0.5 INJECTION, SOLUTION INTRAMUSCULAR; INTRAVENOUS; SUBCUTANEOUS
Status: COMPLETED | OUTPATIENT
Start: 2017-03-21 | End: 2017-03-21

## 2017-03-21 RX ORDER — BUSPIRONE HYDROCHLORIDE 5 MG/1
15 TABLET ORAL 3 TIMES DAILY
Status: DISCONTINUED | OUTPATIENT
Start: 2017-03-21 | End: 2017-03-22 | Stop reason: HOSPADM

## 2017-03-21 RX ORDER — ZOLPIDEM TARTRATE 5 MG/1
10 TABLET ORAL
Status: DISCONTINUED | OUTPATIENT
Start: 2017-03-21 | End: 2017-03-22 | Stop reason: HOSPADM

## 2017-03-21 RX ORDER — ROCURONIUM BROMIDE 10 MG/ML
INJECTION, SOLUTION INTRAVENOUS AS NEEDED
Status: DISCONTINUED | OUTPATIENT
Start: 2017-03-21 | End: 2017-03-21 | Stop reason: HOSPADM

## 2017-03-21 RX ORDER — NEOSTIGMINE METHYLSULFATE 5 MG/5 ML
SYRINGE (ML) INTRAVENOUS AS NEEDED
Status: DISCONTINUED | OUTPATIENT
Start: 2017-03-21 | End: 2017-03-21 | Stop reason: HOSPADM

## 2017-03-21 RX ORDER — SODIUM CHLORIDE 0.9 % (FLUSH) 0.9 %
5-10 SYRINGE (ML) INJECTION AS NEEDED
Status: DISCONTINUED | OUTPATIENT
Start: 2017-03-21 | End: 2017-03-21 | Stop reason: HOSPADM

## 2017-03-21 RX ORDER — CEFAZOLIN SODIUM 2 G/50ML
2 SOLUTION INTRAVENOUS EVERY 8 HOURS
Status: COMPLETED | OUTPATIENT
Start: 2017-03-21 | End: 2017-03-22

## 2017-03-21 RX ORDER — LIDOCAINE HYDROCHLORIDE 20 MG/ML
INJECTION, SOLUTION EPIDURAL; INFILTRATION; INTRACAUDAL; PERINEURAL AS NEEDED
Status: DISCONTINUED | OUTPATIENT
Start: 2017-03-21 | End: 2017-03-21 | Stop reason: HOSPADM

## 2017-03-21 RX ORDER — SODIUM CHLORIDE, SODIUM LACTATE, POTASSIUM CHLORIDE, CALCIUM CHLORIDE 600; 310; 30; 20 MG/100ML; MG/100ML; MG/100ML; MG/100ML
125 INJECTION, SOLUTION INTRAVENOUS CONTINUOUS
Status: DISCONTINUED | OUTPATIENT
Start: 2017-03-21 | End: 2017-03-22 | Stop reason: HOSPADM

## 2017-03-21 RX ORDER — HYDROMORPHONE HYDROCHLORIDE 2 MG/ML
INJECTION, SOLUTION INTRAMUSCULAR; INTRAVENOUS; SUBCUTANEOUS AS NEEDED
Status: DISCONTINUED | OUTPATIENT
Start: 2017-03-21 | End: 2017-03-21 | Stop reason: HOSPADM

## 2017-03-21 RX ORDER — DOCUSATE SODIUM 100 MG/1
100 CAPSULE, LIQUID FILLED ORAL 2 TIMES DAILY
Status: DISCONTINUED | OUTPATIENT
Start: 2017-03-21 | End: 2017-03-22 | Stop reason: HOSPADM

## 2017-03-21 RX ORDER — OXYCODONE HYDROCHLORIDE 5 MG/1
30 TABLET ORAL
Status: DISCONTINUED | OUTPATIENT
Start: 2017-03-21 | End: 2017-03-22 | Stop reason: HOSPADM

## 2017-03-21 RX ORDER — HYDROMORPHONE HYDROCHLORIDE 1 MG/ML
INJECTION, SOLUTION INTRAMUSCULAR; INTRAVENOUS; SUBCUTANEOUS
Status: COMPLETED
Start: 2017-03-21 | End: 2017-03-21

## 2017-03-21 RX ORDER — DEXAMETHASONE SODIUM PHOSPHATE 4 MG/ML
INJECTION, SOLUTION INTRA-ARTICULAR; INTRALESIONAL; INTRAMUSCULAR; INTRAVENOUS; SOFT TISSUE AS NEEDED
Status: DISCONTINUED | OUTPATIENT
Start: 2017-03-21 | End: 2017-03-21 | Stop reason: HOSPADM

## 2017-03-21 RX ORDER — SODIUM CHLORIDE, SODIUM LACTATE, POTASSIUM CHLORIDE, CALCIUM CHLORIDE 600; 310; 30; 20 MG/100ML; MG/100ML; MG/100ML; MG/100ML
125 INJECTION, SOLUTION INTRAVENOUS CONTINUOUS
Status: DISCONTINUED | OUTPATIENT
Start: 2017-03-21 | End: 2017-03-21 | Stop reason: HOSPADM

## 2017-03-21 RX ORDER — PROPOFOL 10 MG/ML
INJECTION, EMULSION INTRAVENOUS AS NEEDED
Status: DISCONTINUED | OUTPATIENT
Start: 2017-03-21 | End: 2017-03-21 | Stop reason: HOSPADM

## 2017-03-21 RX ORDER — CLONAZEPAM 0.5 MG/1
0.5 TABLET ORAL 3 TIMES DAILY
Status: DISCONTINUED | OUTPATIENT
Start: 2017-03-21 | End: 2017-03-22 | Stop reason: HOSPADM

## 2017-03-21 RX ORDER — CEFAZOLIN SODIUM 2 G/50ML
2 SOLUTION INTRAVENOUS ONCE
Status: COMPLETED | OUTPATIENT
Start: 2017-03-21 | End: 2017-03-21

## 2017-03-21 RX ORDER — HYDROMORPHONE HYDROCHLORIDE 2 MG/1
2 TABLET ORAL
Status: DISCONTINUED | OUTPATIENT
Start: 2017-03-21 | End: 2017-03-22 | Stop reason: HOSPADM

## 2017-03-21 RX ORDER — BUPIVACAINE HYDROCHLORIDE AND EPINEPHRINE 2.5; 5 MG/ML; UG/ML
INJECTION, SOLUTION EPIDURAL; INFILTRATION; INTRACAUDAL; PERINEURAL AS NEEDED
Status: DISCONTINUED | OUTPATIENT
Start: 2017-03-21 | End: 2017-03-21 | Stop reason: HOSPADM

## 2017-03-21 RX ORDER — VALACYCLOVIR HYDROCHLORIDE 500 MG/1
1000 TABLET, FILM COATED ORAL DAILY
Status: DISCONTINUED | OUTPATIENT
Start: 2017-03-22 | End: 2017-03-22 | Stop reason: HOSPADM

## 2017-03-21 RX ORDER — MIDAZOLAM HYDROCHLORIDE 1 MG/ML
INJECTION, SOLUTION INTRAMUSCULAR; INTRAVENOUS AS NEEDED
Status: DISCONTINUED | OUTPATIENT
Start: 2017-03-21 | End: 2017-03-21 | Stop reason: HOSPADM

## 2017-03-21 RX ORDER — SODIUM CHLORIDE 0.9 % (FLUSH) 0.9 %
5-10 SYRINGE (ML) INJECTION AS NEEDED
Status: DISCONTINUED | OUTPATIENT
Start: 2017-03-21 | End: 2017-03-22 | Stop reason: HOSPADM

## 2017-03-21 RX ORDER — ACETAMINOPHEN 325 MG/1
650 TABLET ORAL
Status: DISCONTINUED | OUTPATIENT
Start: 2017-03-21 | End: 2017-03-22 | Stop reason: HOSPADM

## 2017-03-21 RX ORDER — DULOXETIN HYDROCHLORIDE 60 MG/1
60 CAPSULE, DELAYED RELEASE ORAL DAILY
Status: DISCONTINUED | OUTPATIENT
Start: 2017-03-22 | End: 2017-03-22 | Stop reason: HOSPADM

## 2017-03-21 RX ORDER — HYDROMORPHONE HYDROCHLORIDE 1 MG/ML
0.5 INJECTION, SOLUTION INTRAMUSCULAR; INTRAVENOUS; SUBCUTANEOUS
Status: DISCONTINUED | OUTPATIENT
Start: 2017-03-21 | End: 2017-03-21 | Stop reason: HOSPADM

## 2017-03-21 RX ORDER — LEVOTHYROXINE SODIUM 88 UG/1
88 TABLET ORAL DAILY
Status: DISCONTINUED | OUTPATIENT
Start: 2017-03-22 | End: 2017-03-22 | Stop reason: HOSPADM

## 2017-03-21 RX ORDER — ROPINIROLE 0.25 MG/1
0.25 TABLET, FILM COATED ORAL
Status: DISCONTINUED | OUTPATIENT
Start: 2017-03-21 | End: 2017-03-22 | Stop reason: HOSPADM

## 2017-03-21 RX ORDER — HYDROMORPHONE HYDROCHLORIDE 4 MG/1
4 TABLET ORAL
Status: DISCONTINUED | OUTPATIENT
Start: 2017-03-21 | End: 2017-03-22 | Stop reason: HOSPADM

## 2017-03-21 RX ORDER — DIAZEPAM 2 MG/1
2 TABLET ORAL
Status: DISCONTINUED | OUTPATIENT
Start: 2017-03-21 | End: 2017-03-22 | Stop reason: HOSPADM

## 2017-03-21 RX ORDER — NALOXONE HYDROCHLORIDE 0.4 MG/ML
0.1 INJECTION, SOLUTION INTRAMUSCULAR; INTRAVENOUS; SUBCUTANEOUS AS NEEDED
Status: DISCONTINUED | OUTPATIENT
Start: 2017-03-21 | End: 2017-03-22 | Stop reason: HOSPADM

## 2017-03-21 RX ORDER — ONDANSETRON 2 MG/ML
INJECTION INTRAMUSCULAR; INTRAVENOUS AS NEEDED
Status: DISCONTINUED | OUTPATIENT
Start: 2017-03-21 | End: 2017-03-21 | Stop reason: HOSPADM

## 2017-03-21 RX ORDER — FENTANYL CITRATE 50 UG/ML
INJECTION, SOLUTION INTRAMUSCULAR; INTRAVENOUS AS NEEDED
Status: DISCONTINUED | OUTPATIENT
Start: 2017-03-21 | End: 2017-03-21 | Stop reason: HOSPADM

## 2017-03-21 RX ORDER — SODIUM CHLORIDE 0.9 % (FLUSH) 0.9 %
5-10 SYRINGE (ML) INJECTION EVERY 8 HOURS
Status: DISCONTINUED | OUTPATIENT
Start: 2017-03-21 | End: 2017-03-22 | Stop reason: HOSPADM

## 2017-03-21 RX ORDER — DIPHENHYDRAMINE HYDROCHLORIDE 50 MG/ML
12.5 INJECTION, SOLUTION INTRAMUSCULAR; INTRAVENOUS
Status: DISCONTINUED | OUTPATIENT
Start: 2017-03-21 | End: 2017-03-22 | Stop reason: HOSPADM

## 2017-03-21 RX ORDER — ONDANSETRON 4 MG/1
4 TABLET, ORALLY DISINTEGRATING ORAL
Status: DISCONTINUED | OUTPATIENT
Start: 2017-03-21 | End: 2017-03-22 | Stop reason: HOSPADM

## 2017-03-21 RX ORDER — GLYCOPYRROLATE 0.2 MG/ML
INJECTION INTRAMUSCULAR; INTRAVENOUS AS NEEDED
Status: DISCONTINUED | OUTPATIENT
Start: 2017-03-21 | End: 2017-03-21 | Stop reason: HOSPADM

## 2017-03-21 RX ADMIN — CLONAZEPAM 0.5 MG: 0.5 TABLET ORAL at 17:12

## 2017-03-21 RX ADMIN — GLYCOPYRROLATE 0.2 MG: 0.2 INJECTION INTRAMUSCULAR; INTRAVENOUS at 12:36

## 2017-03-21 RX ADMIN — FENTANYL CITRATE 100 MCG: 50 INJECTION, SOLUTION INTRAMUSCULAR; INTRAVENOUS at 12:33

## 2017-03-21 RX ADMIN — CLONAZEPAM 0.5 MG: 0.5 TABLET ORAL at 21:44

## 2017-03-21 RX ADMIN — CHLORASEPTIC 1 SPRAY: 1.5 LIQUID ORAL at 22:05

## 2017-03-21 RX ADMIN — Medication 3 MG: at 14:00

## 2017-03-21 RX ADMIN — FENTANYL CITRATE 50 MCG: 50 INJECTION, SOLUTION INTRAMUSCULAR; INTRAVENOUS at 13:12

## 2017-03-21 RX ADMIN — SODIUM CHLORIDE, SODIUM LACTATE, POTASSIUM CHLORIDE, AND CALCIUM CHLORIDE 125 ML/HR: 600; 310; 30; 20 INJECTION, SOLUTION INTRAVENOUS at 23:06

## 2017-03-21 RX ADMIN — SODIUM CHLORIDE, SODIUM LACTATE, POTASSIUM CHLORIDE, AND CALCIUM CHLORIDE 125 ML/HR: 600; 310; 30; 20 INJECTION, SOLUTION INTRAVENOUS at 16:00

## 2017-03-21 RX ADMIN — Medication: at 19:40

## 2017-03-21 RX ADMIN — FENTANYL CITRATE 50 MCG: 50 INJECTION, SOLUTION INTRAMUSCULAR; INTRAVENOUS at 12:57

## 2017-03-21 RX ADMIN — SODIUM CHLORIDE: 900 IRRIGANT IRRIGATION at 13:15

## 2017-03-21 RX ADMIN — HYDROMORPHONE HYDROCHLORIDE 0.5 MG: 1 INJECTION, SOLUTION INTRAMUSCULAR; INTRAVENOUS; SUBCUTANEOUS at 14:50

## 2017-03-21 RX ADMIN — HYDROMORPHONE HYDROCHLORIDE 0.5 MG: 2 INJECTION, SOLUTION INTRAMUSCULAR; INTRAVENOUS; SUBCUTANEOUS at 13:22

## 2017-03-21 RX ADMIN — HYDROMORPHONE HYDROCHLORIDE 0.5 MG: 1 INJECTION, SOLUTION INTRAMUSCULAR; INTRAVENOUS; SUBCUTANEOUS at 14:27

## 2017-03-21 RX ADMIN — DEXAMETHASONE SODIUM PHOSPHATE 4 MG: 4 INJECTION, SOLUTION INTRA-ARTICULAR; INTRALESIONAL; INTRAMUSCULAR; INTRAVENOUS; SOFT TISSUE at 13:22

## 2017-03-21 RX ADMIN — ONDANSETRON 4 MG: 2 INJECTION INTRAMUSCULAR; INTRAVENOUS at 13:22

## 2017-03-21 RX ADMIN — ROCURONIUM BROMIDE 40 MG: 10 INJECTION, SOLUTION INTRAVENOUS at 12:33

## 2017-03-21 RX ADMIN — MIDAZOLAM HYDROCHLORIDE 2 MG: 1 INJECTION, SOLUTION INTRAMUSCULAR; INTRAVENOUS at 12:27

## 2017-03-21 RX ADMIN — HYDROMORPHONE HYDROCHLORIDE 0.5 MG: 1 INJECTION, SOLUTION INTRAMUSCULAR; INTRAVENOUS; SUBCUTANEOUS at 14:35

## 2017-03-21 RX ADMIN — ROPINIROLE HYDROCHLORIDE 0.25 MG: 0.25 TABLET, FILM COATED ORAL at 21:44

## 2017-03-21 RX ADMIN — HYDROMORPHONE HYDROCHLORIDE 0.5 MG: 1 INJECTION, SOLUTION INTRAMUSCULAR; INTRAVENOUS; SUBCUTANEOUS at 14:44

## 2017-03-21 RX ADMIN — Medication: at 14:49

## 2017-03-21 RX ADMIN — DOCUSATE SODIUM 100 MG: 100 CAPSULE, LIQUID FILLED ORAL at 21:44

## 2017-03-21 RX ADMIN — SODIUM CHLORIDE, SODIUM LACTATE, POTASSIUM CHLORIDE, AND CALCIUM CHLORIDE 125 ML/HR: 600; 310; 30; 20 INJECTION, SOLUTION INTRAVENOUS at 10:14

## 2017-03-21 RX ADMIN — SODIUM CHLORIDE, SODIUM LACTATE, POTASSIUM CHLORIDE, AND CALCIUM CHLORIDE: 600; 310; 30; 20 INJECTION, SOLUTION INTRAVENOUS at 13:15

## 2017-03-21 RX ADMIN — HYDROMORPHONE HYDROCHLORIDE 0.5 MG: 1 INJECTION, SOLUTION INTRAMUSCULAR; INTRAVENOUS; SUBCUTANEOUS at 15:03

## 2017-03-21 RX ADMIN — PROPOFOL 150 MG: 10 INJECTION, EMULSION INTRAVENOUS at 12:33

## 2017-03-21 RX ADMIN — ROCURONIUM BROMIDE 10 MG: 10 INJECTION, SOLUTION INTRAVENOUS at 13:12

## 2017-03-21 RX ADMIN — CEFAZOLIN SODIUM 2 G: 2 SOLUTION INTRAVENOUS at 12:45

## 2017-03-21 RX ADMIN — HYDROMORPHONE HYDROCHLORIDE 0.5 MG: 1 INJECTION, SOLUTION INTRAMUSCULAR; INTRAVENOUS; SUBCUTANEOUS at 14:55

## 2017-03-21 RX ADMIN — HYDROMORPHONE HYDROCHLORIDE 0.5 MG: 1 INJECTION, SOLUTION INTRAMUSCULAR; INTRAVENOUS; SUBCUTANEOUS at 14:23

## 2017-03-21 RX ADMIN — HYDROMORPHONE HYDROCHLORIDE 0.5 MG: 2 INJECTION, SOLUTION INTRAMUSCULAR; INTRAVENOUS; SUBCUTANEOUS at 14:14

## 2017-03-21 RX ADMIN — GLYCOPYRROLATE 0.4 MG: 0.2 INJECTION INTRAMUSCULAR; INTRAVENOUS at 14:00

## 2017-03-21 RX ADMIN — LIDOCAINE HYDROCHLORIDE 80 MG: 20 INJECTION, SOLUTION EPIDURAL; INFILTRATION; INTRACAUDAL; PERINEURAL at 12:33

## 2017-03-21 RX ADMIN — SODIUM CHLORIDE, SODIUM LACTATE, POTASSIUM CHLORIDE, AND CALCIUM CHLORIDE 125 ML/HR: 600; 310; 30; 20 INJECTION, SOLUTION INTRAVENOUS at 14:40

## 2017-03-21 RX ADMIN — BUSPIRONE HYDROCHLORIDE 15 MG: 5 TABLET ORAL at 17:12

## 2017-03-21 RX ADMIN — HYDROMORPHONE HYDROCHLORIDE 0.5 MG: 1 INJECTION, SOLUTION INTRAMUSCULAR; INTRAVENOUS; SUBCUTANEOUS at 14:32

## 2017-03-21 RX ADMIN — BUPIVACAINE HYDROCHLORIDE AND EPINEPHRINE BITARTRATE 10 ML: 2.5; .005 INJECTION, SOLUTION EPIDURAL; INFILTRATION; INTRACAUDAL; PERINEURAL at 13:55

## 2017-03-21 RX ADMIN — BUSPIRONE HYDROCHLORIDE 15 MG: 5 TABLET ORAL at 21:44

## 2017-03-21 RX ADMIN — CEFAZOLIN SODIUM 2 G: 2 SOLUTION INTRAVENOUS at 19:40

## 2017-03-21 NOTE — PERIOP NOTES
Handoff Report from Operating Room to PACU   Report received from Julius Mccauley CRNA and Valery Osullivan RN regarding patient, Oxana Jarrell . Surgeon(s): Brianna Jesus MD and Procedure confirmed with allergies and dressings discussed. Anesthesia type, drugs, patient history, complications, estimated blood loss, vital signs, intake and output, and last pain medication, lines, reversal medications and temperature were reviewed. PACU monitoring initiated. Non-rebreather mask with 10 liters 02 applied. 02Sat 100%. Patient is drowsy, able to Follow commands. Dressing to anterior neck CDI, PIV #20 g  Left arm, infusing without difficulty. See Doc flowsheet for physical assessment details.    DEBBI Patel

## 2017-03-21 NOTE — IP AVS SNAPSHOT
[address] [address] 
[phone] Patient: Sangita Mejia MRN: [id number] ENL:5/55/7029 You are allergic to the following Allergen Reactions Incivek (Telaprevir) Anaphylaxis Demerol (Meperidine) Itching Swelling Recent Documentation Height Weight BMI OB Status Smoking Status 1.626 m 73.5 kg 27.83 kg/m2 Hysterectomy Never Smoker Emergency Contacts Name Discharge Info Relation Home Work Mobile Sabine Brooks DISCHARGE CAREGIVER [3] Daughter [21] [phone] Nazario Paz  Son [22]   [phone] About your hospitalization You were admitted on:  March 21, 2017 You last received care in the:  [facility] [address] You were discharged on:  March 22, 2017 Unit phone number:  [phone] Why you were hospitalized Your primary diagnosis was:  Cervical Spinal Stenosis Your diagnoses also included:  Ddd (Degenerative Disc Disease), Cervical, Hnp (Herniated Nucleus Pulposus), Cervical  
  
  
 
  
  
Providers Seen During Your Hospitalizations Provider Role Specialty Primary office phone Mukul Hoff MD Attending Provider Orthopedic Surgery [phone] Your Primary Care Physician (PCP) Primary Care Physician Office Phone [facility] [phone] Follow-up Information Follow up With Details Comments Contact Info Mukul Hoff MD On 4/3/2017 Follow up appointment @ 10:00am [address] [place] [address] Orthopedic and [address] [place] [address] 
[phone] Jesus Connelly MD   [address] [place] [address] 
[phone] [facility] to continue managing your healthcare needs. [phone] Your Appointments Friday March 31, 2017  2:00 PM EDT Follow Up with Omega Angeles PA-C  
 Located within [facility] (STEFANIE SCHEDULING) [address] [facility] [address]  
[phone] Current Discharge Medication List  
  
START taking these medications Dose & Instructions Dispensing Information Comments Morning Noon Evening Bedtime HYDROmorphone 2 mg tablet Commonly known as:  DILAUDID Your last dose was: Your next dose is:    
   
   
 Dose:  2-4 mg Take 1-2 Tabs by mouth every four (4) hours as needed. Max Daily Amount: 24 mg. Quantity:  90 Tab Refills:  0 CONTINUE these medications which have CHANGED Dose & Instructions Dispensing Information Comments Morning Noon Evening Bedtime  
 valACYclovir 1 gram tablet Commonly known as:  VALTREX What changed:   
- when to take this 
- reasons to take this Your last dose was: Your next dose is:    
   
   
 Dose:  1000 mg Take 1 Tab by mouth daily. Quantity:  30 Tab Refills:  2 CONTINUE these medications which have NOT CHANGED Dose & Instructions Dispensing Information Comments Morning Noon Evening Bedtime AMBIEN 10 mg tablet Generic drug:  zolpidem Your last dose was: Your next dose is: Take  by mouth nightly as needed. Refills:  0  
     
   
   
   
  
 busPIRone 15 mg tablet Commonly known as:  BUSPAR Your last dose was: Your next dose is:    
   
   
 Dose:  15 mg Take 15 mg by mouth three (3) times daily. Indications: GENERALIZED ANXIETY DISORDER Refills:  0  
     
   
   
   
  
 CYMBALTA PO Your last dose was: Your next dose is:    
   
   
 Dose:  60 mg Take 60 mg by mouth daily. Refills:  0  
     
   
   
   
  
 FOSAMAX 70 mg tablet Generic drug:  alendronate Your last dose was: Your next dose is: Take  by mouth Every Saturday. Refills:  0 KlonoPIN 1 mg tablet Generic drug:  clonazePAM  
   
Your last dose was: Your next dose is:    
   
   
 Dose:  0.5 mg Take 0.5 mg by mouth three (3) times daily. Indications: anxiety Refills:  0  
     
   
   
   
  
 multivitamin, tx-iron-ca-min 9 mg iron-400 mcg Tab tablet Commonly known as:  THERA-M w/ IRON Your last dose was: Your next dose is:    
   
   
 Dose:  1 Tab Take 1 Tab by mouth daily. Refills:  0  
     
   
   
   
  
 oxyCODONE IR 30 mg immediate release tablet Commonly known as:  OXY-IR Your last dose was: Your next dose is:    
   
   
 Dose:  30 mg Take 1 Tab by mouth three (3) times daily as needed for Pain for up to 30 days. Max Daily Amount: 90 mg. Indications: Pain Quantity:  90 Tab Refills:  0  
     
   
   
   
  
 rOPINIRole 1 mg tablet Commonly known as:  Teddy Adkins Your last dose was: Your next dose is:    
   
   
 Dose:  0.25 mg Take 0.25 mg by mouth nightly. Indications: Restless Legs Syndrome, 3 hours prior to bedtime Refills:  0 SENNA PLUS PO Your last dose was: Your next dose is: Take  by mouth nightly. Refills:  0  
     
   
   
   
  
 SYNTHROID PO Your last dose was: Your next dose is:    
   
   
 Dose:  88 mcg Take 88 mcg by mouth daily. Indications: hypo Refills:  0  
     
   
   
   
  
 VITAMIN B-12 500 mcg tablet Generic drug:  cyanocobalamin Your last dose was: Your next dose is:    
   
   
 Dose:  500 mcg Take 500 mcg by mouth two (2) times a day. Refills:  0  
     
   
   
   
  
 VITAMIN C 500 mg tablet Generic drug:  ascorbic acid (vitamin C) Your last dose was: Your next dose is: Take  by mouth two (2) times a day. Refills:  0  
     
   
   
   
  
 VITAMIN D3 1,000 unit tablet Generic drug:  cholecalciferol Your last dose was: Your next dose is: Take  by mouth daily. Refills:  0 Where to Get Your Medications Information on where to get these meds will be given to you by the nurse or doctor. ! Ask your nurse or doctor about these medications HYDROmorphone 2 mg tablet Discharge Instructions Dr. Cardenas Miriam Operative Instructions: Cervical Fusion *YOU MUST AVOID SMOKING OR BEING AROUND ANYONE WHO SMOKES. AVOID ALL PRODUCTS THAT CONTAIN NICOTINE. DO NOT TAKE IBUPROFEN OR ANTI--INFLAMMATORIES, AS THESE MAY ALTER THE HEALING OF THE FUSION. Diet: 1. Begin with liquids and light foods such as jell-o or soups 2. Advance as tolerated to your regular diet if not nauseated. 3.  Swallowing difficulties may be experienced up to 2 weeks post-operatively. Modify food thickness as necessary. You may also obtain over-the-counter chloraseptic spray. Medications: 1. Strong oral narcotic pain medications have been prescribed for the first few days. Use only as directed. No pain medication is capable of taking away all the pain. Taking your pills at regular intervals will give you the best chance of having less pain. 2. If you need a refill PLEASE PLAN AHEAD. Call our office during regular hours (8-5). 3. Do not combine with alcoholic beverages. 4. Be careful as you walk, climb stairs or drive as mild dizziness is not unusual. 
5. Do not take medications that have not been prescribed by your surgeon. 6. You may switch to over the counter pain medication of your choice as you become more comfortable. Activity and Restrictions: 
1. You should not drive until you are clear by your surgeon at your post-operative visit. 2.  In regards to your cervical collar, you MUST wear this at all times until your first follow-up appointment. 3.  You should wear the collar even when sleeping. 4.  It can be removed for short periods of time as long as you are keeping your head centered over the shoulders without rotating or looking up or down. 5. You may take short walks inside and outside of your home and climb stairs. 6. You are to avoid work, housework, yard work, snow shoveling, lifting of more than a few pounds, overhead work or strenuous activity. 7. Avoid any excessive stretching or range-of-motion of the neck. Non-painful range-of-motion of the neck is allowed 8. Follow-up with Dr. Lydia Franz in 7-10 days. DRIVIN. You should not drive until after your follow-up appointment. 2.  You can be in a vehicle for short distances, but if you travel any long distance, please stop about every 30 minutes and walk/stretch. 3.  You should NEVER drive while taking narcotic medication. BATHING and INCISION CARE: 
1. The incision may be tender to touch or feel numb: this is normal.  
2.  Keep the incision clean and dry. Do not get the incision wet for 5 days. The incision will be closed with sutures under the skin and the skin will be glued. 3.  Do not apply any lotions, ointments or oils on the incision. 4.  If you notice any excessive swelling, redness, or persistent drainage around the incision, notify the office immediately. RETURN TO WORK : 
1. The decision to return to work will be determined on an individual basis. 2.  Many people who have a strenuous job (construction, heavy labor, etc) may need to be off work for up to 8 weeks. 3.  If you need a work note, please let us know as soon as possible, and not the same day you are planning to return to work. NUTRITION : 
1.  Good nutrition is an essential part of healing. 2.  You should eat a balanced diet each day, including fruits, vegetables, dairy products and protein. 3.  Remember to drink plenty of water.   
4.  If you have not had a bowel movement within 3 days of surgery, you will need to use a laxative or suppository that can be obtained over-the-counter at your local pharmacy. BONE STIMULATOR: 
1. A spinal bone stimulator may be prescribed for you under certain situations. 2.  A nurse will call you if one has been requested and discuss its use for approximately 4-6 months post-op every day. 3.  This device assists in bone healing and fusion. CALL THE OFFICE: 
? If you have severe pain unrelieved by the medications, new numbness or tingling in your arms; 
? If you have a fever of 101.0°F or greater;  
? If you notice excessive swelling, redness, or persistent drainage from the incision or IV site; [facility] office number is [phone] from 8:00am to 5:00pm Monday through Friday. After 5:00pm, on weekends, or holidays, please leave a message with our answering service and the doctor on-call will get back to you shortly. Patient armband removed and shredded Discharge Orders None Introducing [facility] SERVICES! Romayne Duster introduces Kerlink patient portal. Now you can access parts of your medical record, email your doctor's office, and request medication refills online. 1. In your internet browser, go to [email or web address] 2. Click on the First Time User? Click Here link in the Sign In box. You will see the New Member Sign Up page. 3. Enter your Kerlink Access Code exactly as it appears below. You will not need to use this code after youve completed the sign-up process. If you do not sign up before the expiration date, you must request a new code. · Kerlink Access Code: [email or web address] Expires: 6/14/2017  3:47 PM 
 
4. Enter the last four digits of your Social Security Number (xxxx) and Date of Birth (mm/dd/yyyy) as indicated and click Submit. You will be taken to the next sign-up page. 5. Create a Kerlink ID.  This will be your Kerlink login ID and cannot be changed, so think of one that is secure and easy to remember. 6. Create a Wakie password. You can change your password at any time. 7. Enter your Password Reset Question and Answer. This can be used at a later time if you forget your password. 8. Enter your e-mail address. You will receive e-mail notification when new information is available in [address] 9. Click Sign Up. You can now view and download portions of your medical record. 10. Click the Download Summary menu link to download a portable copy of your medical information. If you have questions, please visit the Frequently Asked Questions section of the Wakie website. Remember, Wakie is NOT to be used for urgent needs. For medical emergencies, dial 911. Now available from your iPhone and Android! General Information Please provide this summary of care documentation to your next provider. Patient Signature:  ____________________________________________________________ Date:  ____________________________________________________________  
  
Lisset Alvarez Provider Signature:  ____________________________________________________________ Date:  ____________________________________________________________

## 2017-03-21 NOTE — PROGRESS NOTES
[id number] - Patient arrives to unit at this time. Admission completed at this time. Patient is A/O x 4. IV with PCA to left forearm intact and patent. TEDs and SCDs applied bilaterally. Tegaderm and telfa dressing to anterior neck CDI. Cervical collar in place. Reports tingling to bilateral feet which are baseline. 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 MOE drain to neck intact and draining. Radial and Pedal pulses palpable. Pain 8/10. Patient was oriented to the room to include use of call bell, meal ordering, and use of incentive spirometer. Patient was given explanation of \" up for dinner\" program and has verbalized understanding. Phone and call bell left within reach. Plan of care for the day addressed with patient. Educated on pain medication availability and possible side effects. 1939 - Bedside and Verbal shift change report given to Kit Awan RN by Bruno Olson RN. Report included the following information SBAR, Kardex, OR Summary, Intake/Output and MAR.

## 2017-03-21 NOTE — BRIEF OP NOTE
BRIEF OPERATIVE NOTE    Date of Procedure: 3/21/2017   Preoperative Diagnosis: CERVICAL HERNIATED NUCLEUS PULPOSIS W/RADICULOPATHY C4-5,C5-6,STENOSIS CERVICAL REGION,HYPOTHYROIDISM  Postoperative Diagnosis: CERVICAL HERNIATED NUCLEUS PULPOSIS W/RADICULOPATHY C4-5,C5-6,STENOSIS CERVICAL REGION,HYPOTHYROIDISM    Procedure: Procedure(s):  C4-C6 ANTERIOR CERVICAL DISC FUSION W/C-ARM  Surgeon(s) and Role:     * Kassi Wang MD - Primary  Assistant: Lavinia Downing PA-C  Anesthesia: General   Estimated Blood Loss: 20cc  Specimens: * No specimens in log *   Findings: C4-6 spinal stenosis, DDD, HNP  Complications: none  Implants:   Implant Name Type Inv.  Item Serial No.  Lot No. LRB No. Used Action   GRAFT BNE PUTTY BIOACTV 3.75GM -- SIGNAFUSE - [id number]  GRAFT BNE PUTTY BIOACTV 3.75GM -- [address] [facility] [id number] N/A 1 Implanted   SPACER 0-P VA ACIF 8MM --  - [id number]  SPACER 0-P VA ACIF 8MM --   SYNTHES [place] [id number] N/A 1 Implanted   SPACER SPNE V-A LORDTC 6MM -- STRL ZERO-P VA - [id number]  SPACER SPNE V-A LORDTC 6MM -- STRL ZERO-P VA  SY[employer]ES [place] [id number] N/A 1 Implanted   SCR SPNE CERV SD V-A 3.7X14MM -- ZERO-P VA - [id number]   SCR SPNE CERV SD V-A 3.7X14MM -- ZERO-P VA   SY[place]THES [place] [id number] N/A 4 Implanted

## 2017-03-21 NOTE — ANESTHESIA PREPROCEDURE EVALUATION
Anesthetic History   No history of anesthetic complications            Review of Systems / Medical History  Patient summary reviewed, nursing notes reviewed and pertinent labs reviewed    Pulmonary  Within defined limits                 Neuro/Psych         Psychiatric history     Cardiovascular  Within defined limits                Exercise tolerance: >4 METS     GI/Hepatic/Renal           Liver disease    Comments: Hep C -liver enzymes stable per patient Endo/Other      Hypothyroidism  Arthritis     Other Findings              Physical Exam    Airway  Mallampati: III  TM Distance: 4 - 6 cm  Neck ROM: decreased range of motion   Mouth opening: Normal     Cardiovascular  Regular rate and rhythm,  S1 and S2 normal,  no murmur, click, rub, or gallop  Rhythm: regular  Rate: normal         Dental         Pulmonary  Breath sounds clear to auscultation               Abdominal  GI exam deferred       Other Findings            Anesthetic Plan    ASA: 3  Anesthesia type: general          Induction: Intravenous  Anesthetic plan and risks discussed with: Patient and Family

## 2017-03-21 NOTE — PERIOP NOTES
Patient transfer to room 210. Family notified. Handoff with Viktoriya Velarde. Blood pressure 151/81, pulse 68, temperature 97.4 °F (36.3 °C), resp. rate 14, height 5' 4\" (1.626 m), weight 73.5 kg (162 lb 2 oz), SpO2 100 %. PCA verified.

## 2017-03-21 NOTE — ANESTHESIA POSTPROCEDURE EVALUATION
Post-Anesthesia Evaluation and Assessment    Cardiovascular Function/Vital Signs  Visit Vitals    /71    Pulse (!) 59    Temp 36.6 °C (97.8 °F)    Resp 11    Ht 5' 4\" (1.626 m)    Wt 73.5 kg (162 lb 2 oz)    SpO2 97%    BMI 27.83 kg/m2       Patient is status post Procedure(s):  C4-C6 ANTERIOR CERVICAL DISC FUSION W/C-ARM. Nausea/Vomiting: Controlled. Postoperative hydration reviewed and adequate. Pain:  Pain Scale 1: FLACC (03/21/17 1515)  Pain Intensity 1: 0 (03/21/17 1515)   Managed. Neurological Status:   Neuro (WDL): Within Defined Limits (03/21/17 1506)   At baseline. Mental Status and Level of Consciousness: Baseline and stable. Pulmonary Status:   O2 Device: Nasal cannula (03/21/17 1506)   Adequate oxygenation and airway patent. Complications related to anesthesia: None    Post-anesthesia assessment completed. No concerns. Patient has met all discharge requirements.     Signed By: Gianluca Boo MD

## 2017-03-21 NOTE — OP NOTES
Operative Note      Patient: Luiz Sellers               Sex: female          DOA: 3/21/2017         YOB: 1950      Age:  73 y.o. Preoperative Diagnosis: CERVICAL HERNIATED NUCLEUS PULPOSIS W/RADICULOPATHY C4-5,C5-6,STENOSIS CERVICAL REGION,HYPOTHYROIDISM    Postoperative Diagnosis:  CERVICAL HERNIATED NUCLEUS PULPOSIS W/RADICULOPATHY C4-5,C5-6,STENOSIS CERVICAL REGION,HYPOTHYROIDISM    Surgeon: Surgeon(s) and Role:     * Korin Zayas MD - Primary  Assistant: Elizabeth Mckenna PA-C    Anesthesia:  General    Procedure:  Procedure(s):  C4-C6 ANTERIOR CERVICAL DISC FUSION W/C-ARM     Estimated Blood Loss: 50cc                 Implants:   Implant Name Type Inv. Item Serial No.  Lot No. LRB No. Used Action   GRAFT BNE PUTTY BIOACTV 3.75GM -- SIGNAFUSE - [id number]  GRAFT BNE PUTTY BIOACTV 3.75GM -- [address] [facility] [id number] N/A 1 Implanted   SPACER 0-P VA ACIF 8MM --  - [id number]  SPACER 0-P VA ACIF 8MM --   SYNTHES [place] [id number] N/A 1 Implanted   SPACER SPNE V-A LORDTC 6MM -- STRL ZERO-P VA - [id number]  SPACER SPNE V-A LORDTC 6MM -- STRL ZERO-P VA  SYNTHES [place] [id number] N/A 1 Implanted   SCR SPNE CERV SD V-A 3.7X14MM -- ZERO-P VA - [id number]   SCR SPNE CERV SD V-A 3.7X14MM -- ZERO-P VA   SYNTHES [place] [id number] N/A 4 Implanted       Drains: MOE           Complications:  None              Indications: This is a 73y.o. year-old female who presents with significant neck and upper extremity pain, worsening ability to do activities of daily living. X-rays and MRI scan revealing spinal stenosis, degenerative disk disease and disk herniation. Having failed conservative care, he comes for operative intervention. Procedure Details:   After appropriate informed consent was obtained, the patient was taken to the operating suite and placed in a supine position on the operating table. Satisfactory general endotracheal anesthesia was induced.   All pressure points were carefully padded. Perioperative antibiotics were given. The anterior neck was shaved, prepped, and draped in the usual sterile fashion. Intraoperative fluoroscopy was used to localize the C4-5 level. A transverse linear incision was made in the left anterior neck directly and was carried down through the subcutaneous tissue. The platysma was divided with electrocautery in a transverse fashion and was undermined both superiorly and inferiorly. Dissection was continued down along the anterior border of the sternocleidomastoid muscle. The carotid artery was palpated and was swept laterally. A plane was identified between the paratracheal musculature and the sternocleidmastoid  into the prevertebral space and was developed both superiorly and inferiorly using blunt dissection. Intraoperative fluoroscopy and a spinal needle were used to localize the C4-5 disc space. The prevertebral fascia was then incised longitudinally, and the longus colli muscles were swept laterally away from the bony elements of the spine on both sides. A self-retaining retractor with smooth blades was placed in the medial and lateral wound. 14 mm distraction pins were placed in the superior and inferior vertebral bodies. Next, the C4-5 and C5-6 discs were removed completely with curettes and pituitary rongeurs. Cartilaginous endplates were removed. Posterolateral osteophytes were removed using the 2mm Kerrison rongeur. The posterior longitudinal ligament was opened with nerve hook and generous foraminotomies were created bilaterally. The nerve roots and spinal cord were found to be freely decompressed. The wound was then irrigated copiously with antibiotic solution. Meticulous hemostasis was obtained. Osteophytes were removed from the posterior and anterior vertebral bodies with the elida. The cartilagenous endplates were also removed from each of the vertebral bodies down to bleeding subchondral bone.   The interbody space were then sized for PEEK interbody spacers with trial sizers and PEEK interbody spacers were filled with Conform Cube bone allograft then impacted into the interbody space. Each found to have a nice, snug fit. ANTERIOR INSTRUMENTATION:  Next, a Synthes Zero-P anterior cervical plate was placed at each level C4-5 and C5-6. Screws were then placed sequentially starting with an drill then followed by 14 mm locking screws. Two screws were placed through each plate securing the plates to each vertebra under fluoroscopic guidance. Intraoperative fluoroscopy confirmed the entire construct to be in good position. The wound was once more copiously irrigated with antibiotic solution. The self-retaining retractor was removed from the wound. Meticulous hemostasis was obtained. The esophagus was inspected and was found to be intact. A MOE drain was inserted. The platysma was closed using interrupted 2-0 Vicryl sutures. The skin edges were closed with 3-0 PDS suture and approximated using Dermabond. A sterile dressing was applied to the wound. The patient was then transferred back to the stretcher where satisfactory general endotracheal anesthesia was reversed. The patient was then taken to the Recovery Room in satisfactory condition.

## 2017-03-21 NOTE — PERIOP NOTES
Pt pain 10/10. Gave 2 mg dilaudid, notified Dr Barby Amaya, received order to give more Dilaudid prn.

## 2017-03-21 NOTE — PROGRESS NOTES
Problem: Mobility Impaired (Adult and Pediatric)  Goal: *Acute Goals and Plan of Care (Insert Text)  In 1-7 days pt will be able to perform:  STG  1. Bed mobility: Demonstrate proper log-roll technique for safe initiation of rolling for OOB activities. 2. Supine to sit to supine S with HR for meals. 3. Sit to stand to sit S in prep for ambulation. LT. Gait: Ambulate 150ft S with cervical collar, for home/community mobility. 2. Stair Negotiation: Ascend/descend >2 steps CGA with HR for home entry. 3. Activity tolerance: Tolerate up in chair 30 minutes-1 hour for ADLs. 4. Patient/Family Education: Patient/family to be independent with HEP for follow-up care and safe discharge. PHYSICAL THERAPY EVALUATION     Patient: Amalia Moralez (81 y.o. female)  Date: 3/21/2017  Primary Diagnosis: CERVICAL HERNIATED NUCLEUS PULPOSIS W/RADICULOPATHY C4-5,C5-6,STENOSIS CERVICAL REGION,HYPOTHYROIDISM  Cervical spinal stenosis  Procedure(s) (LRB):  C4-C6 ANTERIOR CERVICAL DISC FUSION W/C-ARM (N/A) Day of Surgery   Precautions:   Fall (cervical)      ASSESSMENT :  Based on the objective data described below, the patient presents with decreased functional mobility and independence in regard to bed mobility, transfers, gt quality and tolerance, generalized strength, pain, stair negotiation and safety due to recent cervical surgery. Pt rating pain in cervical spine 9/10 on numerical pain scale. Pt sitting EOB upon arrival and requesting to use bathroom. Pt able to participate in gt training w/ GB and CGA/min A w/ mild instability. Pt able to void on commode and perform own hygiene. Pt returned to sitting EOB w/ all needs within reach, SCDs applied. Nurse David Appiah aware and daughter present. Recommend [facility] upon hospital d/c. Patient will benefit from skilled intervention to address the above impairments.   Patients rehabilitation potential is considered to be Good  Factors which may influence rehabilitation potential include:   [ ]         None noted  [ ]         Mental ability/status  [ ]         Medical condition  [ ]         Home/family situation and support systems  [ ]         Safety awareness  [X]         Pain tolerance/management  [ ]         Other:        PLAN :  Recommendations and Planned Interventions:  [X]           Bed Mobility Training             [ ]    Neuromuscular Re-Education  [X]           Transfer Training                   [ ]    Orthotic/Prosthetic Training  [X]           Gait Training                          [ ]    Modalities  [X]           Therapeutic Exercises          [ ]    Edema Management/Control  [X]           Therapeutic Activities            [X]    Patient and Family Training/Education  [ ]           Other (comment):     Frequency/Duration: Patient will be followed by physical therapy twice daily to address goals. Discharge Recommendations: Home Health  Further Equipment Recommendations for Discharge: N/A       SUBJECTIVE:   Patient stated I need to go pee.       OBJECTIVE DATA SUMMARY:       Past Medical History:   Diagnosis Date    Arthritis       osteoporosis    Fibromyalgia      Headache      Hearing reduced       deaf in right ear , hearing impaired in left    Hepatitis       hep c resolved    Hypothyroid       hypo    Peripheral neuropathy ([facility] [address]      Psychiatric disorder       anxiety depression    Restless leg syndrome       Past Surgical History:   Procedure Laterality Date    HX HYSTERECTOMY        HX ORTHOPAEDIC   2004     cervical fusion    HX ORTHOPAEDIC         bilateral carpal tunnel    HX OTHER SURGICAL         liver biopsy    HX TONSILLECTOMY        VASCULAR SURGERY PROCEDURE UNLIST   2013     varicose veins ablation     Barriers to Learning/Limitations: None  Compensate with: visual, verbal, tactile, kinesthetic cues/model  Prior Level of Function/Home Situation:   Home Situation  Home Environment: Apartment  # Steps to Enter: 3  One/Two Story Residence: One story  Living Alone: Yes  Support Systems: Family member(s)  Patient Expects to be Discharged to[de-identified] Apartment  Current DME Used/Available at Home: None  Critical Behavior:  Neurologic State: Alert; Appropriate for age  Orientation Level: Oriented X4  Cognition: Appropriate decision making; Appropriate for age attention/concentration; Appropriate safety awareness; Follows commands  Safety/Judgement: Awareness of environment  Psychosocial  Patient Behaviors: Calm; Cooperative  Family  Behaviors: Supportive;Calm  Skin Condition/Temp: Dry;Warm  Family  Behaviors: Supportive;Calm  Skin Integrity: Incision (comment) (cervical)  Skin Integumentary  Skin Color: Appropriate for ethnicity  Skin Condition/Temp: Dry;Warm  Skin Integrity: Incision (comment) (cervical)  Turgor: Non-tenting  Hair Growth: Present  Varicosities: Absent  Strength:    Strength: Generally decreased, functional  Tone & Sensation:   Tone: Normal  Sensation: Intact  Range Of Motion:  AROM: Generally decreased, functional  Functional Mobility:  Bed Mobility:  Scooting: Contact guard assistance (vc)  Transfers:  Sit to Stand: Contact guard assistance (vc)  Stand to Sit: Contact guard assistance (vc)  Balance:   Sitting: Intact  Standing: Intact; With support  Ambulation/Gait Training:  Distance (ft): 32 Feet (ft)  Assistive Device: Gait belt;Brace/Splint  Ambulation - Level of Assistance: Contact guard assistance;Minimal assistance (vc)  Gait Abnormalities: Ataxic;Decreased step clearance;Shuffling gait  Base of Support: Widened  Speed/Dinorah: Slow;Shuffled  Step Length: Left shortened;Right shortened  Interventions: Safety awareness training;Verbal cues  Therapeutic Exercises:   Pain:  Pain Scale 1: Numeric (0 - 10)  Pain Intensity 1: 8  Pain Location 1:  (cervical)  Pain Orientation 1: Posterior  Pain Description 1: Aching  Pain Intervention(s) 1: Encouraged PCA  Activity Tolerance:   Fair   Please refer to the flowsheet for vital signs taken during this treatment. After treatment:   [ ]         Patient left in no apparent distress sitting up in chair  [X]         Patient left in no apparent distress in bed  [X]         Call bell left within reach  [X]         Nursing notified  [ ]         Caregiver present  [ ]         Bed alarm activated      COMMUNICATION/EDUCATION:   [X]         Fall prevention education was provided and the patient/caregiver indicated understanding. [X]         Patient/family have participated as able in goal setting and plan of care. [X]         Patient/family agree to work toward stated goals and plan of care. [ ]         Patient understands intent and goals of therapy, but is neutral about his/her participation. [ ]         Patient is unable to participate in goal setting and plan of care.      Thank you for this referral.  Lisset Cano, PT   Time Calculation: 11 mins  Eval Complexity: History: LOW Complexity : Zero comorbidities / personal factors that will impact the outcome / POCExam:LOW Complexity : 1-2 Standardized tests and measures addressing body structure, function, activity limitation and / or participation in recreation  Presentation: LOW Complexity : Stable, uncomplicated  Clinical Decision Making:Low Complexity amb >30' RW Overall Complexity:LOW

## 2017-03-21 NOTE — INTERVAL H&P NOTE
H&P Update:  Sangita Mejia was seen and examined. History and physical has been reviewed. The patient has been examined.  There have been no significant clinical changes since the completion of the originally dated History and Physical.    Signed By: Mukul Hoff MD     March 21, 2017 7:40 AM

## 2017-03-22 VITALS
OXYGEN SATURATION: 100 % | SYSTOLIC BLOOD PRESSURE: 138 MMHG | TEMPERATURE: 98.1 F | HEIGHT: 64 IN | BODY MASS INDEX: 27.68 KG/M2 | HEART RATE: 66 BPM | WEIGHT: 162.13 LBS | RESPIRATION RATE: 18 BRPM | DIASTOLIC BLOOD PRESSURE: 69 MMHG

## 2017-03-22 PROBLEM — M50.20 HNP (HERNIATED NUCLEUS PULPOSUS), CERVICAL: Status: RESOLVED | Noted: 2017-03-15 | Resolved: 2017-03-22

## 2017-03-22 PROBLEM — M48.02 CERVICAL SPINAL STENOSIS: Status: RESOLVED | Noted: 2017-03-15 | Resolved: 2017-03-22

## 2017-03-22 LAB
ERYTHROCYTE [DISTWIDTH] IN BLOOD BY AUTOMATED COUNT: 12.3 % (ref 11.6–14.5)
HCT VFR BLD AUTO: 39.7 % (ref 35–45)
HGB BLD-MCNC: 13.2 G/DL (ref 12–16)
MCH RBC QN AUTO: 31.9 PG (ref 24–34)
MCHC RBC AUTO-ENTMCNC: 33.2 G/DL (ref 31–37)
MCV RBC AUTO: 95.9 FL (ref 74–97)
PLATELET # BLD AUTO: 176 K/UL (ref 135–420)
PMV BLD AUTO: 9.7 FL (ref 9.2–11.8)
RBC # BLD AUTO: 4.14 M/UL (ref 4.2–5.3)
WBC # BLD AUTO: 8.9 K/UL (ref 4.6–13.2)

## 2017-03-22 PROCEDURE — 97116 GAIT TRAINING THERAPY: CPT

## 2017-03-22 PROCEDURE — 36415 COLL VENOUS BLD VENIPUNCTURE: CPT | Performed by: PHYSICIAN ASSISTANT

## 2017-03-22 PROCEDURE — 85027 COMPLETE CBC AUTOMATED: CPT | Performed by: PHYSICIAN ASSISTANT

## 2017-03-22 PROCEDURE — 97165 OT EVAL LOW COMPLEX 30 MIN: CPT

## 2017-03-22 PROCEDURE — 74011250636 HC RX REV CODE- 250/636: Performed by: ORTHOPAEDIC SURGERY

## 2017-03-22 PROCEDURE — 74011250637 HC RX REV CODE- 250/637: Performed by: PHYSICIAN ASSISTANT

## 2017-03-22 RX ORDER — HYDROMORPHONE HYDROCHLORIDE 2 MG/1
2-4 TABLET ORAL
Qty: 90 TAB | Refills: 0 | Status: SHIPPED | OUTPATIENT
Start: 2017-03-22 | End: 2017-06-21 | Stop reason: ALTCHOICE

## 2017-03-22 RX ADMIN — HYDROMORPHONE HYDROCHLORIDE 4 MG: 4 TABLET ORAL at 01:25

## 2017-03-22 RX ADMIN — HYDROMORPHONE HYDROCHLORIDE 4 MG: 4 TABLET ORAL at 09:59

## 2017-03-22 RX ADMIN — BUSPIRONE HYDROCHLORIDE 15 MG: 5 TABLET ORAL at 08:54

## 2017-03-22 RX ADMIN — CLONAZEPAM 0.5 MG: 0.5 TABLET ORAL at 09:02

## 2017-03-22 RX ADMIN — DOCUSATE SODIUM 100 MG: 100 CAPSULE, LIQUID FILLED ORAL at 08:55

## 2017-03-22 RX ADMIN — DULOXETINE HYDROCHLORIDE 60 MG: 60 CAPSULE, DELAYED RELEASE ORAL at 08:54

## 2017-03-22 RX ADMIN — VALACYCLOVIR HYDROCHLORIDE 1000 MG: 500 TABLET, FILM COATED ORAL at 08:55

## 2017-03-22 RX ADMIN — HYDROMORPHONE HYDROCHLORIDE 4 MG: 4 TABLET ORAL at 05:23

## 2017-03-22 RX ADMIN — CEFAZOLIN SODIUM 2 G: 2 SOLUTION INTRAVENOUS at 13:01

## 2017-03-22 RX ADMIN — CEFAZOLIN SODIUM 2 G: 2 SOLUTION INTRAVENOUS at 05:22

## 2017-03-22 RX ADMIN — LEVOTHYROXINE SODIUM 88 MCG: 88 TABLET ORAL at 08:55

## 2017-03-22 NOTE — PROGRESS NOTES
Problem: Mobility Impaired (Adult and Pediatric)  Goal: *Acute Goals and Plan of Care (Insert Text)  In 1-7 days pt will be able to perform:  STG  1. Bed mobility: Demonstrate proper log-roll technique for safe initiation of rolling for OOB activities. 2. Supine to sit to supine S with HR for meals. 3. Sit to stand to sit S in prep for ambulation. LT. Gait: Ambulate 150ft S with cervical collar, for home/community mobility. 2. Stair Negotiation: Ascend/descend >2 steps CGA with HR for home entry. 3. Activity tolerance: Tolerate up in chair 30 minutes-1 hour for ADLs. 4. Patient/Family Education: Patient/family to be independent with HEP for follow-up care and safe discharge. Outcome: Resolved/Met Date Met:  17  PHYSICAL THERAPY TREATMENT/DISCHARGE     Patient: Michael Kerr (76 y.o. female)  Date: 3/22/2017  Diagnosis: CERVICAL HERNIATED NUCLEUS PULPOSIS W/RADICULOPATHY C4-5,C5-6,STENOSIS CERVICAL REGION,HYPOTHYROIDISM  Cervical spinal stenosis Cervical spinal stenosis  Procedure(s) (LRB):  C4-C6 ANTERIOR CERVICAL DISC FUSION W/C-ARM (N/A) 1 Day Post-Op  Precautions: Fall  Chart, physical therapy assessment, plan of care and goals were reviewed. ASSESSMENT:  Pt has met all acute care PT goals at this time for safe return to home with HHPT vs. OPPT. During gait training pt ambulated HH distances without the use of an AD. During gait training pt ascended and descended 6 stairs with single HR use, CGA with a non-reciprocal pattern. Left pt sitting up at the EOB with all needs met. Educated pt on the importance of HEP, home ambulation and home safety due to increased falls risk; pt verbalized understanding.   Progression toward goals:  [X]      Goals met  [ ]      Improving appropriately and progressing toward goals  [ ]      Improving slowly and progressing toward goals  [ ]      Not making progress toward goals and plan of care will be adjusted       PLAN:  Patient will be discharged from physical therapy at this time. Rationale for discharge:  [X] Goals Achieved  [ ] Lu Ashley  [ ] Patient not participating in therapy  [ ] Other:  Discharge Recommendations:  Home Health vs. Outpatient  Further Equipment Recommendations for Discharge:  N/A       SUBJECTIVE:   Patient stated I feel great, I am ready to go home.       OBJECTIVE DATA SUMMARY:   Critical Behavior:  Neurologic State: Alert, Appropriate for age  Orientation Level: Appropriate for age, Oriented X4  Cognition: Appropriate decision making, Appropriate for age attention/concentration, Appropriate safety awareness, Follows commands  Safety/Judgement: Awareness of environment, Fall prevention, Good awareness of safety precautions, Insight into deficits  Functional Mobility Training:  Bed Mobility:   Scooting: Supervision   Transfers:  Sit to Stand: Supervision  Stand to Sit: Supervision  Balance:  Sitting: Intact  Standing: Intact; With support  Ambulation/Gait Training:  Distance (ft): 186 Feet (ft)  Assistive Device: Gait belt;Brace/Splint  Ambulation - Level of Assistance: Supervision   Gait Abnormalities: Decreased step clearance   Base of Support: Widened   Speed/Dinorah: Pace decreased (<100 feet/min)  Step Length: Right shortened;Left shortened  Swing Pattern: Left symmetrical;Right symmetrical   Interventions: Visual/Demos; Safety awareness training   Stairs:  Number of Stairs Trained: 6  Stairs - Level of Assistance: Contact guard assistance              Rail Use: Right   Pain:  Pain Scale 1: Numeric (0 - 10)  Pain Intensity 1: 8  Pain Location 1: Back;Neck;Head  Pain Orientation 1: Posterior  Pain Description 1: Aching  Pain Intervention(s) 1: Medication (see MAR)  Activity Tolerance:   Good  Please refer to the flowsheet for vital signs taken during this treatment.   After treatment:   [ ] Patient left in no apparent distress sitting up in chair  [ ] Patient left in no apparent distress in bed  [ ] Call bell left within reach  [ ] Nursing notified  [ ] Caregiver present  [ ] Bed alarm activated     Alicia Christine PT, DPT      Time Calculation: 16 mins

## 2017-03-22 NOTE — ROUTINE PROCESS
Bedside shift change report given to Corazon Alcaraz RN (oncoming nurse) by Eugenio Jose RN  (offgoing nurse). Report included the following information SBAR and Kardex.

## 2017-03-22 NOTE — ROUTINE PROCESS
0753-Bedside and Verbal shift change report given to Miguel RN/MICHAEL Rebolledo RN (oncoming nurse) by DEEP Nuñez RN (offgoing nurse). Report included the following information SBAR, Kardex, Intake/Output and MAR.

## 2017-03-22 NOTE — PROGRESS NOTES
Problem: Self Care Deficits Care Plan (Adult)  Goal: *Acute Goals and Plan of Care (Insert Text)  Outcome: Resolved/Met Date Met:  03/22/17  OCCUPATIONAL THERAPY EVALUATION/DISCHARGE     Patient: Jerrod Padron (64 y.o. female)  Date: 3/22/2017  Primary Diagnosis: CERVICAL HERNIATED NUCLEUS PULPOSIS W/RADICULOPATHY C4-5,C5-6,STENOSIS CERVICAL REGION,HYPOTHYROIDISM  Cervical spinal stenosis  Procedure(s) (LRB):  C4-C6 ANTERIOR CERVICAL DISC FUSION W/C-ARM (N/A) 1 Day Post-Op   Precautions:   Fall      ASSESSMENT AND RECOMMENDATIONS:  Based on the objective data described below, the patient presents with ability to perform ADL tasks at baseline level. Pt seated EOB upon entering, agreeable to therapy. Pt donned button up shirt with supervision. Pt donned slip on shoes with supervision. Pt completed functional mobility with supervision. Pt left seated EOB with needs in reach. Pt with no further OT/ADL concerns at this time. Skilled occupational therapy is not indicated at this time.      Education: Role of OT in acute care, plan of care     Discharge Recommendations: Home Health  Further Equipment Recommendations for Discharge: N/A        SUBJECTIVE:   Patient stated .      OBJECTIVE DATA SUMMARY:       Past Medical History:   Diagnosis Date    Arthritis       osteoporosis    Fibromyalgia      Headache      Hearing reduced       deaf in right ear , hearing impaired in left    Hepatitis       hep c resolved    Hypothyroid       hypo    Peripheral neuropathy ([facility] [address]      Psychiatric disorder       anxiety depression    Restless leg syndrome       Past Surgical History:   Procedure Laterality Date    HX HYSTERECTOMY        HX ORTHOPAEDIC   2004     cervical fusion    HX ORTHOPAEDIC         bilateral carpal tunnel    HX OTHER SURGICAL         liver biopsy    HX TONSILLECTOMY        VASCULAR SURGERY PROCEDURE UNLIST   2013     varicose veins ablation     Barriers to Learning/Limitations: None  Compensate with: visual, verbal, tactile, kinesthetic cues/model  GCODES(GO):Self Care  Current  CI= 1-19%   Goal  CI= 1-19%   D/C  CI= 1-19%. The severity rating is based on the Other modified barthel index  Prior Level of Function/Home Situation: I with ADLs  Home Situation  Home Environment: Apartment  # Steps to Enter: 3  One/Two Story Residence: One story  Living Alone: Yes  Support Systems: Family member(s)  Patient Expects to be Discharged to[de-identified] Apartment  Current DME Used/Available at Home: None     Cognitive/Behavioral Status:  Neurologic State: Alert; Appropriate for age  Orientation Level: Appropriate for age;Oriented X4  Cognition: Appropriate decision making; Appropriate for age attention/concentration; Appropriate safety awareness; Follows commands  Safety/Judgement: Awareness of environment; Fall prevention;Good awareness of safety precautions; Insight into deficits  Coordination:  Coordination: Generally decreased, functional  Fine Motor Skills-Upper: Right Intact; Left Intact    Gross Motor Skills-Upper: Left Intact; Right Intact  Balance:  Sitting: Intact  Standing: Intact; With support  Strength:  Strength: Generally decreased, functional     Tone & Sensation:  Tone: Normal  Sensation: Intact  Range of Motion:  AROM: Generally decreased, functional     Functional Mobility and Transfers for ADLs:  Bed Mobility:  Scooting: Supervision  Transfers:  Sit to Stand: Supervision              Bathroom Mobility: Supervision/set up  ADL Assessment:  Upper Body Dressing: Supervision     Lower Body Dressing: Supervision     ADL Intervention:  Upper Body Dressing Assistance  Dressing Assistance: Supervision/set-up  Front Opened Shirt: Supervision/set-up     Lower Body Dressing Assistance  Dressing Assistance: Supervision/set up  Slip on Shoes Without Back: Supervision/set-up     Cognitive Retraining  Safety/Judgement: Awareness of environment; Fall prevention;Good awareness of safety precautions; Insight into deficits     Pain:  Pre-treatment: 0  Post-treatment: 0  Activity Tolerance:   good  Please refer to the flowsheet for vital signs taken during this treatment. After treatment:   [ ]  Patient left in no apparent distress sitting up in chair  [X]  Patient left in no apparent distress in bed  [X]  Call bell left within reach  [ ]  Nursing notified  [ ]  Caregiver present  [ ]  Bed alarm activated      COMMUNICATION/EDUCATION:   Communication/Collaboration:  [X]      Home safety education was provided and the patient/caregiver indicated understanding. [X]      Patient/family have participated as able and agree with findings and recommendations. [ ]      Patient is unable to participate in plan of care at this time.      Cher Kirk MS OTR/L  Time Calculation: 15 mins

## 2017-03-22 NOTE — PROGRESS NOTES
2115 - Patient sitting on side of bed at this time. A/O x 4. IV to left forearm  intact and patent. SCDs and TEDs to bilateral legs. Non-adherent with tegaderm dressing to anterior neck, CDI. No numbness/tingling. Radial and Pedal pulses palpable. Lungs CTA. Bowel sounds active to all quadrants. Pain 3/10. Up walking halls with PT, gait steady. 0959-Pain rated 8/10 to neck, back, and head. Dilaudid 4 mg given. 1050- Pain decreased to 5/10. Patient resting in bed.    1345-Dual AVS reviewed with Jorid WERNER. All medications reviewed individually with patient. Opportunities for questions and concerns provided. Patient discharged via (mode of transport ie. Car, ambulance or air transport) car  Patient's arm band appropriately discarded.

## 2017-03-22 NOTE — PROGRESS NOTES
Shift assessment completed. Pt in bed with family at bedside. A/Ox4. Pain scale 07/10, pt encourage to use PCA. Pt denies any SOB or difficulty breathing. Pt denies any chest pain. Pt denies any numbness or tingling to extremities. Pt denies any calf pain. MOE drain 20ml. Pt informed of frequent visit from staff for rounding check, vs, medication, and labwork. 0530-MOE drain removed,0 ml  Pt tolerated well. Pt refused CHG wipes due to it causing dryness and itchiness of skin.

## 2017-03-22 NOTE — PROGRESS NOTES
Met with pt prior to discharge. Pt has daughter who is coming to pick her up and will be staying with her and then son will arrive to stay with her for approx a week. FOC offered and pt chose [facility]  9876 for follow up; referral placed with CMS. No DME needs at this time. Care Management Interventions  PCP Verified by CM:  Yes  Transition of Care Consult (CM Consult): [address]: No  Reason Outside [place]: Physician referred to specific agency ([facility])  Discharge Durable Medical Equipment: No  Physical Therapy Consult: Yes  Occupational Therapy Consult: Yes  Current Support Network: Own Home, Lives Alone  Confirm Follow Up Transport: Family  Plan discussed with Pt/Family/Caregiver: Yes  Freedom of Choice Offered: Yes  Discharge Location  Discharge Placement: Home with home health

## 2017-03-22 NOTE — PROGRESS NOTES
Progress Note POD #1      Patient: Nori Redd               Sex: female          DOA: 3/21/2017         YOB: 1950      Surgery: Procedure(s):  C4-C6 ANTERIOR CERVICAL DISC FUSION W/C-ARM         LOS:  LOS: 1 day               Subjective:     No new complaints. Denies arm pain. Sore throat but swallowing well. Eating breakfast now. Wants to go home. Objective:      Visit Vitals    /72 (BP 1 Location: Right arm, BP Patient Position: At rest)    Pulse 70    Temp 99 °F (37.2 °C)    Resp 16    Ht 5' 4\" (1.626 m)    Wt 73.5 kg (162 lb 2 oz)    SpO2 99%    BMI 27.83 kg/m2       Physical Exam:  Neurological: motor strength: 5/5 in upper and lower extremities bilaterally                          sensation: intact to light touch    Intake and Output:  Current Shift:     Last three shifts:  03/20 1901 - 03/22 0700  In: 2650.6 [P.O.:720; I.V.:1930.6]  Out: 1070 [Urine:1050; Drains:20]    Lab/Data Reviewed:  Lab Results   Component Value Date/Time    WBC 8.9 03/22/2017 01:42 AM    HGB 13.2 03/22/2017 01:42 AM    HCT 39.7 03/22/2017 01:42 AM    PLATELET 653 45/04/8082 01:42 AM    MCV 95.9 03/22/2017 01:42 AM     No results found for: APTT  Lab Results   Component Value Date/Time    INR 1.0 07/28/2010 02:23 PM    Prothrombin time 12.7 07/28/2010 02:23 PM          Assessment/Plan     Principal Problem:    Cervical spinal stenosis (3/15/2017)    Active Problems:    DDD (degenerative disc disease), cervical (3/15/2017)      HNP (herniated nucleus pulposus), cervical (3/15/2017)        1. Stable  2. D/C PCA  3. D/C home after cleared by PT  4. F/U at [facility] in 10 days  5. Change dressing- apply telfa & opsite.   6. D/C drain

## 2017-03-31 ENCOUNTER — OFFICE VISIT (OUTPATIENT)
Dept: PAIN MANAGEMENT | Age: 67
End: 2017-03-31

## 2017-03-31 VITALS
WEIGHT: 162 LBS | BODY MASS INDEX: 27.81 KG/M2 | DIASTOLIC BLOOD PRESSURE: 93 MMHG | HEART RATE: 85 BPM | SYSTOLIC BLOOD PRESSURE: 146 MMHG

## 2017-03-31 DIAGNOSIS — M79.7 FIBROMYALGIA: ICD-10-CM

## 2017-03-31 DIAGNOSIS — M50.30 DDD (DEGENERATIVE DISC DISEASE), CERVICAL: Primary | ICD-10-CM

## 2017-03-31 DIAGNOSIS — Z98.1 S/P CERVICAL SPINAL FUSION: ICD-10-CM

## 2017-03-31 DIAGNOSIS — M79.2 NEURALGIA AND NEURITIS: ICD-10-CM

## 2017-03-31 DIAGNOSIS — G62.9 PERIPHERAL POLYNEUROPATHY: ICD-10-CM

## 2017-03-31 DIAGNOSIS — M81.0 OSTEOPOROSIS: ICD-10-CM

## 2017-03-31 RX ORDER — OXYCODONE HYDROCHLORIDE 30 MG/1
30 TABLET ORAL 4 TIMES DAILY
Qty: 120 TAB | Refills: 0 | Status: SHIPPED | OUTPATIENT
Start: 2017-04-08 | End: 2017-03-31 | Stop reason: SDUPTHER

## 2017-03-31 RX ORDER — NALOXONE HYDROCHLORIDE 4 MG/.1ML
4 SPRAY NASAL
Qty: 1 PACKAGE | Refills: 0 | Status: SHIPPED | OUTPATIENT
Start: 2017-03-31

## 2017-03-31 RX ORDER — OXYCODONE HYDROCHLORIDE 30 MG/1
30 TABLET ORAL 4 TIMES DAILY
Qty: 120 TAB | Refills: 0 | Status: SHIPPED | OUTPATIENT
Start: 2017-05-07 | End: 2017-06-07 | Stop reason: SDUPTHER

## 2017-03-31 NOTE — PATIENT INSTRUCTIONS
Plan:  Continue same medications as prescribed for chronic pain--temporary dose increase on oxycodone to four per day   We will prescribe Naloxone 4 mg nasal spray to use in case of accidental overdose.    Follow up in 2 months or sooner if needed  Regular exercise and attention to emotional health and diet remain the most effective ways to treat chronic pain of all kinds  You may contact me with questions or concerns through [phone]

## 2017-03-31 NOTE — MR AVS SNAPSHOT
Visit Information Date & Time Provider Department Dept. Phone Encounter #  
 3/31/2017  2:00 PM Artem Sullivan [address] [place] [address] for Pain Management [phone] [id number] Follow-up Instructions Return in about 9 weeks (around 6/2/2017). Follow-up and Disposition History Your Appointments 5/31/2017 11:40 AM  
Follow Up with Fernie Blake MD  
[address] [place] [address] for Pain Management [facility]) Appt Note: Return in about 9 weeks (around 6/2/2017). [address] [facility] [id number] [phone] [facility] [phone] [id number] Upcoming Health Maintenance Date Due DTaP/Tdap/Td series (1 - Tdap) 9/25/1972 BREAST CANCER SCRN MAMMOGRAM 9/25/2001 ZOSTER VACCINE AGE 60> 9/25/2011 INFLUENZA AGE 9 TO ADULT 8/1/2016 GLAUCOMA SCREENING Q2Y 9/25/2016 Pneumococcal 65+ Low/Medium Risk (1 of 2 - PCV13) 9/25/2016 MEDICARE YEARLY EXAM 9/25/2016 COLONOSCOPY 11/7/2021 Allergies as of 3/31/2017  Review Complete On: 3/31/2017 By: Manuel Ruiz LPN Severity Noted Reaction Type Reactions Incivek [Telaprevir] High 02/23/2017    Anaphylaxis Demerol [Meperidine]  08/01/2012    Itching, Swelling Current Immunizations  Never Reviewed No immunizations on file. Not reviewed this visit You Were Diagnosed With   
  
 Codes Comments DDD (degenerative disc disease), cervical    -  Primary ICD-10-CM: M50.30 ICD-9-CM: 722.4 Peripheral polyneuropathy     ICD-10-CM: G62.9 ICD-9-CM: 356.9 Neuralgia and neuritis     ICD-10-CM: M79.2 ICD-9-CM: 729.2 Osteoporosis     ICD-10-CM: M81.0 ICD-9-CM: 733.00 S/P cervical spinal fusion     ICD-10-CM: Z98.1 ICD-9-CM: V45.4 Fibromyalgia     ICD-10-CM: M79.7 ICD-9-CM: 729.1 Vitals BP Pulse Weight(growth percentile) BMI OB Status Smoking Status [facility] ) 146/93 85 162 lb (73.5 kg) 27.81 kg/m2 Hysterectomy Never Smoker Vitals History BMI and BSA Data Body Mass Index Body Surface Area  
 27.81 kg/m 2 1.82 m 2 Preferred Pharmacy Pharmacy Name Phone [id number] [phone] Your Updated Medication List  
  
   
This list is accurate as of: 3/31/17  3:26 PM.  Always use your most recent med list.  
  
  
  
  
 AMBIEN 10 mg tablet Generic drug:  zolpidem Take  by mouth nightly as needed. busPIRone 15 mg tablet Commonly known as:  BUSPAR Take 15 mg by mouth three (3) times daily. Indications: GENERALIZED ANXIETY DISORDER  
  
 CYMBALTA PO Take 60 mg by mouth daily. FOSAMAX 70 mg tablet Generic drug:  alendronate Take  by mouth Every Saturday. HYDROmorphone 2 mg tablet Commonly known as:  DILAUDID Take 1-2 Tabs by mouth every four (4) hours as needed. Max Daily Amount: 24 mg. KlonoPIN 1 mg tablet Generic drug:  clonazePAM  
Take 0.5 mg by mouth three (3) times daily. Indications: anxiety  
  
 multivitamin, tx-iron-ca-min 9 mg iron-400 mcg Tab tablet Commonly known as:  THERA-M w/ IRON Take 1 Tab by mouth daily. naloxone 4 mg/actuation Spry 4 mg by Nasal route once as needed (for opioid overdose) for up to 1 dose. Indications: OPIOID TOXICITY  
  
 oxyCODONE IR 30 mg immediate release tablet Commonly known as:  OXY-IR Take 1 Tab by mouth four (4) times daily for 30 days. Max Daily Amount: 120 mg. Indications: Pain Start taking on:  5/7/2017  
  
 rOPINIRole 1 mg tablet Commonly known as:  Farnaz Schimke Take 0.25 mg by mouth nightly. Indications: Restless Legs Syndrome, 3 hours prior to bedtime SENNA PLUS PO Take  by mouth nightly. SYNTHROID PO Take 88 mcg by mouth daily. Indications: hypo  
  
 valACYclovir 1 gram tablet Commonly known as:  VALTREX Take 1 Tab by mouth daily. VITAMIN B-12 500 mcg tablet Generic drug:  cyanocobalamin Take 500 mcg by mouth two (2) times a day. VITAMIN C 500 mg tablet Generic drug:  ascorbic acid (vitamin C) Take  by mouth two (2) times a day. VITAMIN D3 1,000 unit tablet Generic drug:  cholecalciferol Take  by mouth daily. Prescriptions Printed Refills  
 oxyCODONE IR (OXY-IR) 30 mg immediate release tablet 0 Starting on: 2017 Sig: Take 1 Tab by mouth four (4) times daily for 30 days. Max Daily Amount: 120 mg. Indications: Pain Class: Print Route: Oral  
  
Prescriptions Sent to Pharmacy Refills  
 naloxone 4 mg/actuation spry 0 Si mg by Nasal route once as needed (for opioid overdose) for up to 1 dose. Indications: OPIOID TOXICITY Class: Normal  
 Pharmacy: [address], [facility] #: [phone] Route: Nasal  
  
Follow-up Instructions Return in about 9 weeks (around 2017). Patient Instructions Plan: 
Continue same medications as prescribed for chronic pain--temporary dose increase on oxycodone to four per day We will prescribe Naloxone 4 mg nasal spray to use in case of accidental overdose. Follow up in 2 months or sooner if needed Regular exercise and attention to emotional health and diet remain the most effective ways to treat chronic pain of all kinds You may contact me with questions or concerns through [address] Patient Instructions History Introducing [facility]! [address] introduces [employer] patient portal. Now you can access parts of your medical record, email your doctor's office, and request medication refills online. 1. In your internet browser, go to [email or web address] 2. Click on the First Time User? Click Here link in the Sign In box. You will see the New Member Sign Up page. 3. Enter your [employer] Access Code exactly as it appears below.  You will not need to use this code after youve completed the sign-up process. If you do not sign up before the expiration date, you must request a new code. · [facility] Access Code: [email or web address] Expires: 6/14/2017  3:47 PM 
 
4. Enter the last four digits of your Social Security Number (xxxx) and Date of Birth (mm/dd/yyyy) as indicated and click Submit. You will be taken to the next sign-up page. 5. Create a [facility] ID. This will be your [facility] login ID and cannot be changed, so think of one that is secure and easy to remember. 6. Create a [facility] password. You can change your password at any time. 7. Enter your Password Reset Question and Answer. This can be used at a later time if you forget your password. 8. Enter your e-mail address. You will receive e-mail notification when new information is available in [address]. 9. Click Sign Up. You can now view and download portions of your medical record. 10. Click the Download Summary menu link to download a portable copy of your medical information. If you have questions, please visit the Frequently Asked Questions section of the [facility] website. Remember, [facility] is NOT to be used for urgent needs. For medical emergencies, dial 911. Now available from your iPhone and Android! Please provide this summary of care documentation to your next provider. Your primary care clinician is listed as Venessa Winter. If you have any questions after today's visit, please call [phone].

## 2017-03-31 NOTE — PROGRESS NOTES
HISTORY OF PRESENT ILLNESS  Chiquis Valdez is a 73 y.o. female. Patient presents for follow up of chronic, severe pain which is widespread and multifocal and includes peripheral neuropathic pain and polyarthralgias. She also suffers with chronic neck pain due to cervical DDD and postlaminectomy syndrome. She reports that Dr. Matthew Cerda performed revision and extension of her cervical fusion to C4/C5 on 3/21/17 (ten days ago!). She was unaware that she could have rescheduled today's appointment, woried that she would \"cause trouble\". She was encouraged in the future to call and she can receive a bridge RX. Dr. Matthew Cerda did prescribed additional medications (#90 Dilaudid 2 mg), and she is aware that she may accept more if she needs it. She will call to disclose any additional prescriptions received. Medications continue to work well for pain control overall. Chiquis Valdez is tolerating medications well, with no untoward side effects noted. She is able to stay more active with less discomfort with these current doses. The patient reports an average of 50% relief with this regimen. Most recent UDS and  were consistent with prescribed medications. Pill counts are appropriate. She is informed of side effects, risks, and benefits of this regimen, and emphasizes that she derives a significant improvement in functionality and quality of life, and notes that non-opioid medications and therapies in the past have not offered significant benefit. She does request a temporary dose increase on oxycodone to four tablets per day until her neck is fully healed. We will prescribe Naloxone 4 mg nasal spray to use in case of accidental overdose. The patient is aware not to use Naloxone for any reasons other than opioid toxicity, as its use may precipitate withdrawals.  The patient was instructed on directions and indications for use, and has also been advised to inform family members on how to use naloxone if overdose occurs. The patient expresses understanding. She denies new or worsening insomnia or constipation issues. She denies any falls, injuries, or hospitalizations since the last visit. HPI--see above    ROS  Constitutional: Positive for malaise/fatigue. Gastrointestinal: Positive for constipation, heartburn and nausea. Musculoskeletal: Positive for back pain, joint pain (polyarticular), myalgias and neck pain. Neurological: Positive for weakness (generalized). Psychiatric/Behavioral: The patient has insomnia. All other systems reviewed and are negative. Physical Exam  Constitutional: She is oriented to person, place, and time. She appears cachectic. She has a sickly appearance. She appears distressed. HENT:   Head: Normocephalic and atraumatic. Right Ear: External ear normal.   Left Ear: External ear normal.   Nose: Nose normal.   Mouth/Throat: Oropharynx is clear and moist. No oropharyngeal exudate. Eyes: Conjunctivae and EOM are normal. Pupils are equal, round, and reactive to light. Right eye exhibits no discharge. Left eye exhibits no discharge. No scleral icterus. Neck: Muscular tenderness: spasm. Decreased range of motion present. No Brudzinski's sign and no Kernig's sign noted. No thyromegaly present. Cardiovascular: Normal rate, regular rhythm and normal heart sounds. Pulmonary/Chest: Effort normal and breath sounds normal. No respiratory distress. She has no wheezes. She has no rales. Abdominal: Soft. She exhibits no distension. There is no tenderness. There is no rebound and no guarding. Musculoskeletal: She exhibits tenderness. Right shoulder: She exhibits decreased range of motion and tenderness. Left shoulder: She exhibits decreased range of motion and tenderness. Right elbow: She exhibits decreased range of motion. Tenderness found. Left elbow: She exhibits decreased range of motion. Tenderness found.         Right wrist: She exhibits decreased range of motion and tenderness. Left wrist: She exhibits decreased range of motion and tenderness. Right hip: She exhibits decreased range of motion and tenderness. Left hip: She exhibits decreased range of motion and tenderness. Right knee: She exhibits decreased range of motion. Tenderness found. Left knee: She exhibits decreased range of motion. Tenderness found. Right ankle: She exhibits decreased range of motion. Tenderness. Left ankle: She exhibits decreased range of motion. Tenderness. Lumbar back: She exhibits decreased range of motion, tenderness, pain and spasm. Neurological: She is alert and oriented to person, place, and time. She has normal reflexes. No cranial nerve deficit or sensory deficit. She exhibits normal muscle tone. Gait abnormal. Coordination normal.   Reflex Scores:       Tricep reflexes are 2+ on the right side and 2+ on the left side. Bicep reflexes are 2+ on the right side and 2+ on the left side. Brachioradialis reflexes are 2+ on the right side and 2+ on the left side. Patellar reflexes are 2+ on the right side and 2+ on the left side. Achilles reflexes are 2+ on the right side and 2+ on the left side. Skin: Skin is warm. No rash noted. Psychiatric: Her speech is normal and behavior is normal. Judgment and thought content normal. She exhibits a depressed mood. ASSESSMENT and PLAN    ICD-10-CM ICD-9-CM    1. DDD (degenerative disc disease), cervical M50.30 722.4    2. Peripheral polyneuropathy G62.9 356.9    3. Neuralgia and neuritis M79.2 729.2    4. Osteoporosis M81.0 733.00    5. S/P cervical spinal fusion Z98.1 V45.4    6. Fibromyalgia M79.7 729.1       Plan:  Continue same medications as prescribed for chronic pain--temporary dose increase on oxycodone to four per day   We will prescribe Naloxone 4 mg nasal spray to use in case of accidental overdose.    Follow up in 2 months or sooner if needed  Regular exercise and attention to emotional health and diet remain the most effective ways to treat chronic pain of all kinds  You may contact me with questions or concerns through [address]

## 2017-03-31 NOTE — PROGRESS NOTES
Nursing Notes    Patient presents to the office today in follow-up. Patient rates her pain at 5/10 on the numerical pain scale. Reviewed medications with counts as follows:    Rx Date filled Qty Dispensed Pill Count Last Dose Short   Oxycodone 30mg 03/10/17 90 27 Today  No                                             Comments:     POC UDS was not performed in office today    Any new labs or imaging since last appointment? YES    Have you been to an emergency room (ER) or urgent care clinic since your last visit? YES            Have you been hospitalized since your last visit? NO     If yes, where, when, and reason for visit? Have you seen or consulted any other health care providers outside of the [address] [place] [address]  since your last visit? YES     If yes, where, when, and reason for visit? Emergency dept physicians, and surgeon     HM deferred to pcp. Patient noted with undisclosed prescription for dilaudid (#90) filled on 03/22/17; advised to notify office of all outside medications.

## 2017-06-07 ENCOUNTER — OFFICE VISIT (OUTPATIENT)
Dept: PAIN MANAGEMENT | Age: 67
End: 2017-06-07

## 2017-06-07 DIAGNOSIS — Z79.899 ENCOUNTER FOR LONG-TERM (CURRENT) USE OF HIGH-RISK MEDICATION: Primary | ICD-10-CM

## 2017-06-07 LAB
ALCOHOL UR POC: NORMAL
AMPHETAMINES UR POC: NEGATIVE
BARBITURATES UR POC: NEGATIVE
BENZODIAZEPINES UR POC: NEGATIVE
BUPRENORPHINE UR POC: NORMAL
CANNABINOIDS UR POC: NEGATIVE
CARISOPRODOL UR POC: NORMAL
COCAINE UR POC: NEGATIVE
FENTANYL UR POC: NORMAL
MDMA/ECSTASY UR POC: NEGATIVE
METHADONE UR POC: NEGATIVE
METHAMPHETAMINE UR POC: NEGATIVE
METHYLPHENIDATE UR POC: NEGATIVE
OPIATES UR POC: NEGATIVE
OXYCODONE UR POC: NORMAL
PHENCYCLIDINE UR POC: NEGATIVE
PROPOXYPHENE UR POC: NEGATIVE
TRAMADOL UR POC: NORMAL
TRICYCLICS UR POC: NORMAL

## 2017-06-07 RX ORDER — OXYCODONE HYDROCHLORIDE 30 MG/1
30 TABLET ORAL 4 TIMES DAILY
Qty: 120 TAB | Refills: 0 | Status: SHIPPED | OUTPATIENT
Start: 2017-06-07 | End: 2017-06-21 | Stop reason: SDUPTHER

## 2017-06-21 ENCOUNTER — OFFICE VISIT (OUTPATIENT)
Dept: PAIN MANAGEMENT | Age: 67
End: 2017-06-21

## 2017-06-21 VITALS — WEIGHT: 162 LBS | BODY MASS INDEX: 27.81 KG/M2

## 2017-06-21 DIAGNOSIS — M79.2 NEURALGIA AND NEURITIS: ICD-10-CM

## 2017-06-21 DIAGNOSIS — G89.4 CHRONIC PAIN SYNDROME: ICD-10-CM

## 2017-06-21 DIAGNOSIS — G60.9 IDIOPATHIC PERIPHERAL NEUROPATHY: Primary | ICD-10-CM

## 2017-06-21 DIAGNOSIS — F32.A DEPRESSION, UNSPECIFIED DEPRESSION TYPE: ICD-10-CM

## 2017-06-21 DIAGNOSIS — I73.9 PERIPHERAL VASCULAR DISEASE (HCC): ICD-10-CM

## 2017-06-21 DIAGNOSIS — M50.30 DDD (DEGENERATIVE DISC DISEASE), CERVICAL: ICD-10-CM

## 2017-06-21 RX ORDER — OXYCODONE HYDROCHLORIDE 30 MG/1
30 TABLET ORAL 4 TIMES DAILY
Qty: 120 TAB | Refills: 0 | Status: SHIPPED | OUTPATIENT
Start: 2017-08-03 | End: 2017-09-02

## 2017-06-21 RX ORDER — HYDROXYZINE PAMOATE 25 MG/1
25 CAPSULE ORAL
COMMUNITY

## 2017-06-21 RX ORDER — OXYCODONE HYDROCHLORIDE 30 MG/1
30 TABLET ORAL 4 TIMES DAILY
Qty: 120 TAB | Refills: 0 | Status: SHIPPED | OUTPATIENT
Start: 2017-09-01 | End: 2017-09-08 | Stop reason: SDUPTHER

## 2017-06-21 RX ORDER — OXYCODONE HYDROCHLORIDE 30 MG/1
30 TABLET ORAL 4 TIMES DAILY
Qty: 120 TAB | Refills: 0 | Status: SHIPPED | OUTPATIENT
Start: 2017-07-05 | End: 2017-08-04

## 2017-06-21 NOTE — PROGRESS NOTES
Nursing Notes    Patient presents to the office today in follow-up. Patient rates her pain at 7/10 on the numerical pain scale. Reviewed medications with counts as follows:    Rx Date filled Qty Dispensed Pill Count Last Dose Short   rubi 30 6/7/17 120 74 6/21/17 no         Comments:     POC UDS was not performed in office today    Any new labs or imaging since last appointment? NO    Have you been to an emergency room (ER) or urgent care clinic since your last visit? NO            Have you been hospitalized since your last visit? NO     If yes, where, when, and reason for visit? Have you seen or consulted any other health care providers outside of the [address] [place] [address]  since your last visit? NO     If yes, where, when, and reason for visit? Ms. Iris Narvaez has a reminder for a \"due or due soon\" health maintenance. I have asked that she contact her primary care provider for follow-up on this health maintenance.

## 2017-06-21 NOTE — PROGRESS NOTES
HISTORY OF PRESENT ILLNESS  Laquita Grewal is a 73 y.o. female. HPI Patient presents for follow up of chronic, severe pain which is widespread and multifocal and includes peripheral neuropathic pain and polyarthralgias. She also suffers with chronic neck pain due to cervical DDD and postlaminectomy syndrome. Medications continue to work well for pain control overall. Laquita Grewal is tolerating medications well, with no untoward side effects noted. She is able to stay more active with less discomfort with these current doses. The patient reports an average of 80% relief with this regimen. Most recent UDS and  were consistent with prescribed medications. Pill counts are appropriate. She is informed of side effects, risks, and benefits of this regimen, and emphasizes that she derives a significant improvement in functionality and quality of life, and notes that non-opioid medications and therapies in the past have not offered significant benefit. She continues to recover uneventfully from her cervical spine surgery which was performed on 3/21/17. Pain level today 7 out of 10,outcome 10/28, (The lower the upper number, the better the outcome)  Physical activity and mobility as well as sleep and mood are good. No reported side effects. A current review of the  does not identify any inconsistency. Review of Systems   Constitutional: Positive for malaise/fatigue. Gastrointestinal: Positive for constipation, heartburn and nausea. Musculoskeletal: Positive for back pain, joint pain (polyarticular), myalgias and neck pain. Neurological: Positive for weakness (generalized). Psychiatric/Behavioral: The patient has insomnia. All other systems reviewed and are negative. Physical Exam   Constitutional: She is oriented to person, place, and time. She appears cachectic. She has a sickly appearance. No distress. HENT:   Head: Normocephalic and atraumatic.    Right Ear: External ear normal.   Left Ear: External ear normal.   Nose: Nose normal.   Mouth/Throat: Oropharynx is clear and moist. No oropharyngeal exudate. Eyes: Conjunctivae and EOM are normal. Pupils are equal, round, and reactive to light. Right eye exhibits no discharge. Left eye exhibits no discharge. No scleral icterus. Neck: Muscular tenderness: spasm. Decreased range of motion present. No Brudzinski's sign and no Kernig's sign noted. No thyromegaly present. Cardiovascular: Normal rate, regular rhythm and normal heart sounds. Pulmonary/Chest: Effort normal and breath sounds normal. No respiratory distress. She has no wheezes. She has no rales. Abdominal: Soft. She exhibits no distension. There is no tenderness. There is no rebound and no guarding. Musculoskeletal: She exhibits tenderness. Right shoulder: She exhibits decreased range of motion and tenderness. Left shoulder: She exhibits decreased range of motion and tenderness. Right elbow: She exhibits decreased range of motion. Tenderness found. Left elbow: She exhibits decreased range of motion. Tenderness found. Right wrist: She exhibits decreased range of motion and tenderness. Left wrist: She exhibits decreased range of motion and tenderness. Right hip: She exhibits decreased range of motion and tenderness. Left hip: She exhibits decreased range of motion and tenderness. Right knee: She exhibits decreased range of motion. Tenderness found. Left knee: She exhibits decreased range of motion. Tenderness found. Right ankle: She exhibits decreased range of motion. Tenderness. Left ankle: She exhibits decreased range of motion. Tenderness. Lumbar back: She exhibits decreased range of motion, tenderness, pain and spasm. Neurological: She is alert and oriented to person, place, and time. She has normal reflexes. No cranial nerve deficit or sensory deficit.  She exhibits normal muscle tone. Gait abnormal. Coordination normal.   Reflex Scores:       Tricep reflexes are 2+ on the right side and 2+ on the left side. Bicep reflexes are 2+ on the right side and 2+ on the left side. Brachioradialis reflexes are 2+ on the right side and 2+ on the left side. Patellar reflexes are 2+ on the right side and 2+ on the left side. Achilles reflexes are 2+ on the right side and 2+ on the left side. Skin: Skin is warm. No rash noted. Psychiatric: Her speech is normal and behavior is normal. Judgment and thought content normal. She exhibits a depressed mood. Nursing note and vitals reviewed. ASSESSMENT and PLAN  Encounter Diagnoses   Name Primary?  Idiopathic peripheral neuropathy Yes    Neuralgia and neuritis     DDD (degenerative disc disease), cervical     Chronic pain syndrome     Peripheral vascular disease (HCC)     Depression, unspecified depression type      She will continue on her current analgesic regimen as this is providing good pain control with improved functionality and minimal side effects. 3 month reassess her    No concerns are raised for misuse, abuse, or diversion. 1. Pain medications are prescribed with the objective of pain relief and improved physical and psychosocial function in this patient. 2. Counseled patient on proper use of prescribed medications and reviewed opioid contract. 3. Counseled patient about chronic medical conditions and their relationship to anxiety and depression and recommended mental health support as needed. 4. Reviewed with patient self-help tools, home exercise, and lifestyle changes to assist the patient in self-management of symptoms. 5. Advised patient to have a primary care provider to continue care for health maintenance and general medical conditions and support for referral to specialty care as needed.   6. Reviewed with patient the treatment plan, goals of treatment plan, and limitations of treatment plan, to include the potential for side effects from medications and procedures. If side effects occur, it is the responsibility of the patient to inform the clinic so that a change in the treatment plan can be made in a safe manner. The patient is advised that stopping prescribed medication may cause an increase in symptoms and possible medication withdrawal symptoms. The patient is informed an emergency room evaluation may be necessary if this occurs. DISPOSITION: The patients condition and plan were discussed at length and all questions were answered. The patient agrees with the plan.     Counseling occupied > 50% of visit:  Total time: 30 minutes

## 2017-06-21 NOTE — MR AVS SNAPSHOT
Visit Information Date & Time Provider Department Dept. Phone Encounter #  
 6/21/2017 11:20 AM Cam Booth MD [facility] [phone] Follow-up Instructions Return in about 3 months (around 9/21/2017). Upcoming Health Maintenance Date Due DTaP/Tdap/Td series (1 - Tdap) 9/25/1972 BREAST CANCER SCRN MAMMOGRAM 9/25/2001 ZOSTER VACCINE AGE 60> 9/25/2011 GLAUCOMA SCREENING Q2Y 9/25/2016 Pneumococcal 65+ Low/Medium Risk (1 of 2 - PCV13) 9/25/2016 MEDICARE YEARLY EXAM 9/25/2016 INFLUENZA AGE 9 TO ADULT 8/1/2017 COLONOSCOPY 11/7/2021 Allergies as of 6/21/2017  Review Complete On: 6/21/2017 By: Cam Booth MD  
  
 Severity Noted Reaction Type Reactions Incivek [Telaprevir] High 02/23/2017    Anaphylaxis Demerol [Meperidine]  08/01/2012    Itching, Swelling Current Immunizations  Never Reviewed No immunizations on file. Not reviewed this visit You Were Diagnosed With   
  
 Codes Comments Idiopathic peripheral neuropathy    -  Primary ICD-10-CM: G60.9 ICD-9-CM: 356.9 Neuralgia and neuritis     ICD-10-CM: M79.2 ICD-9-CM: 729.2 DDD (degenerative disc disease), cervical     ICD-10-CM: M50.30 ICD-9-CM: 722.4 Chronic pain syndrome     ICD-10-CM: G89.4 ICD-9-CM: 338.4 Peripheral vascular disease ([facility] [address]     ICD-10-CM: I73.9 ICD-9-CM: 443.9 Depression, unspecified depression type     ICD-10-CM: F32.9 ICD-9-CM: 171 Vitals Weight(growth percentile) BMI OB Status Smoking Status 162 lb (73.5 kg) 27.81 kg/m2 Hysterectomy Never Smoker BMI and BSA Data Body Mass Index Body Surface Area  
 27.81 kg/m 2 1.82 m 2 Preferred Pharmacy Pharmacy Name Phone [phone] [phone] Your Updated Medication List  
  
   
 This list is accurate as of: 6/21/17 11:38 AM.  Always use your most recent med list.  
  
  
  
  
 AMBIEN 10 mg tablet Generic drug:  zolpidem Take  by mouth nightly as needed. busPIRone 15 mg tablet Commonly known as:  BUSPAR Take 15 mg by mouth three (3) times daily. Indications: GENERALIZED ANXIETY DISORDER  
  
 CYMBALTA PO Take 60 mg by mouth daily. FOSAMAX 70 mg tablet Generic drug:  alendronate Take  by mouth Every Saturday. hydrOXYzine pamoate 25 mg capsule Commonly known as:  VISTARIL Take 25 mg by mouth three (3) times daily as needed for Itching. multivitamin, tx-iron-ca-min 9 mg iron-400 mcg Tab tablet Commonly known as:  THERA-M w/ IRON Take 1 Tab by mouth daily. naloxone 4 mg/actuation Spry 4 mg by Nasal route once as needed (for opioid overdose) for up to 1 dose. Indications: OPIOID TOXICITY  
  
 * oxyCODONE IR 30 mg immediate release tablet Commonly known as:  OXY-IR Take 1 Tab by mouth four (4) times daily for 30 days. Max Daily Amount: 120 mg. Indications: Pain Start taking on:  7/5/2017 * oxyCODONE IR 30 mg immediate release tablet Commonly known as:  OXY-IR Take 1 Tab by mouth four (4) times daily for 30 days. Max Daily Amount: 120 mg. Indications: Pain Start taking on:  8/3/2017 * oxyCODONE IR 30 mg immediate release tablet Commonly known as:  OXY-IR Take 1 Tab by mouth four (4) times daily for 30 days. Max Daily Amount: 120 mg. Indications: Pain Start taking on:  9/1/2017  
  
 rOPINIRole 1 mg tablet Commonly known as:  Arvella Res Take 0.25 mg by mouth nightly. Indications: Restless Legs Syndrome, 3 hours prior to bedtime SENNA PLUS PO Take  by mouth nightly. SYNTHROID PO Take 88 mcg by mouth daily. Indications: hypo  
  
 valACYclovir 1 gram tablet Commonly known as:  VALTREX Take 1 Tab by mouth daily. VITAMIN B-12 500 mcg tablet Generic drug:  cyanocobalamin Take 500 mcg by mouth two (2) times a day. VITAMIN C 500 mg tablet Generic drug:  ascorbic acid (vitamin C) Take  by mouth two (2) times a day. VITAMIN D3 1,000 unit tablet Generic drug:  cholecalciferol Take  by mouth daily. * Notice: This list has 3 medication(s) that are the same as other medications prescribed for you. Read the directions carefully, and ask your doctor or other care provider to review them with you. Prescriptions Printed Refills  
 oxyCODONE IR (OXY-IR) 30 mg immediate release tablet 0 Starting on: 9/1/2017 Sig: Take 1 Tab by mouth four (4) times daily for 30 days. Max Daily Amount: 120 mg. Indications: Pain Class: Print Route: Oral  
 oxyCODONE IR (OXY-IR) 30 mg immediate release tablet 0 Starting on: 8/3/2017 Sig: Take 1 Tab by mouth four (4) times daily for 30 days. Max Daily Amount: 120 mg. Indications: Pain Class: Print Route: Oral  
 oxyCODONE IR (OXY-IR) 30 mg immediate release tablet 0 Starting on: 7/5/2017 Sig: Take 1 Tab by mouth four (4) times daily for 30 days. Max Daily Amount: 120 mg. Indications: Pain Class: Print Route: Oral  
  
Follow-up Instructions Return in about 3 months (around 9/21/2017). Patient Instructions Current health maintenance issues were reviewed and the patient was advised to followup with his/her PCP for completion of these items. Introducing hospitals & HEALTH SERVICES! Erlin Turner introduces Jingdong patient portal. Now you can access parts of your medical record, email your doctor's office, and request medication refills online. 1. In your internet browser, go to [email or web address] 2. Click on the First Time User? Click Here link in the Sign In box. You will see the New Member Sign Up page. 3. Enter your Jingdong Access Code exactly as it appears below. You will not need to use this code after youve completed the sign-up process.  If you do not sign up before the expiration date, you must request a new code. · ev-social Access Code: [email or web address] Expires: 9/19/2017 11:38 AM 
 
4. Enter the last four digits of your Social Security Number (xxxx) and Date of Birth (mm/dd/yyyy) as indicated and click Submit. You will be taken to the next sign-up page. 5. Create a ev-social ID. This will be your ev-social login ID and cannot be changed, so think of one that is secure and easy to remember. 6. Create a ev-social password. You can change your password at any time. 7. Enter your Password Reset Question and Answer. This can be used at a later time if you forget your password. 8. Enter your e-mail address. You will receive e-mail notification when new information is available in [address] 9. Click Sign Up. You can now view and download portions of your medical record. 10. Click the Download Summary menu link to download a portable copy of your medical information. If you have questions, please visit the Frequently Asked Questions section of the ev-social website. Remember, ev-social is NOT to be used for urgent needs. For medical emergencies, dial 911. Now available from your iPhone and Android! Please provide this summary of care documentation to your next provider. Your primary care clinician is listed as Cathy Eli. If you have any questions after today's visit, please call [phone].

## 2017-09-08 ENCOUNTER — OFFICE VISIT (OUTPATIENT)
Dept: PAIN MANAGEMENT | Age: 67
End: 2017-09-08

## 2017-09-08 VITALS
HEART RATE: 82 BPM | RESPIRATION RATE: 20 BRPM | DIASTOLIC BLOOD PRESSURE: 82 MMHG | TEMPERATURE: 97.1 F | SYSTOLIC BLOOD PRESSURE: 161 MMHG | BODY MASS INDEX: 27.81 KG/M2 | WEIGHT: 162 LBS

## 2017-09-08 DIAGNOSIS — M79.605 BILATERAL LEG AND FOOT PAIN: Primary | ICD-10-CM

## 2017-09-08 DIAGNOSIS — G60.9 IDIOPATHIC PERIPHERAL NEUROPATHY: ICD-10-CM

## 2017-09-08 DIAGNOSIS — M79.2 NEURALGIA AND NEURITIS: ICD-10-CM

## 2017-09-08 DIAGNOSIS — M79.604 BILATERAL LEG AND FOOT PAIN: Primary | ICD-10-CM

## 2017-09-08 DIAGNOSIS — R60.0 BILATERAL LEG EDEMA: ICD-10-CM

## 2017-09-08 DIAGNOSIS — M79.7 FIBROMYALGIA: ICD-10-CM

## 2017-09-08 DIAGNOSIS — M79.672 BILATERAL LEG AND FOOT PAIN: Primary | ICD-10-CM

## 2017-09-08 DIAGNOSIS — I73.9 PERIPHERAL VASCULAR DISEASE (HCC): ICD-10-CM

## 2017-09-08 DIAGNOSIS — Z98.1 S/P CERVICAL SPINAL FUSION: ICD-10-CM

## 2017-09-08 DIAGNOSIS — M79.671 BILATERAL LEG AND FOOT PAIN: Primary | ICD-10-CM

## 2017-09-08 DIAGNOSIS — G89.4 CHRONIC PAIN SYNDROME: ICD-10-CM

## 2017-09-08 DIAGNOSIS — M50.30 DDD (DEGENERATIVE DISC DISEASE), CERVICAL: ICD-10-CM

## 2017-09-08 DIAGNOSIS — I87.2 SAPHENOFEMORAL VENOUS REFLUX: ICD-10-CM

## 2017-09-08 RX ORDER — OXYCODONE HYDROCHLORIDE 30 MG/1
30 TABLET ORAL 4 TIMES DAILY
Qty: 120 TAB | Refills: 0 | Status: SHIPPED | OUTPATIENT
Start: 2017-12-03 | End: 2017-09-08 | Stop reason: SDUPTHER

## 2017-09-08 RX ORDER — OXYCODONE HYDROCHLORIDE 30 MG/1
30 TABLET ORAL 4 TIMES DAILY
Qty: 120 TAB | Refills: 0 | Status: SHIPPED | OUTPATIENT
Start: 2018-01-02 | End: 2018-01-25 | Stop reason: SDUPTHER

## 2017-09-08 RX ORDER — OXYCODONE HYDROCHLORIDE 30 MG/1
30 TABLET ORAL 4 TIMES DAILY
Qty: 120 TAB | Refills: 0 | Status: SHIPPED | OUTPATIENT
Start: 2017-10-06 | End: 2017-09-08 | Stop reason: SDUPTHER

## 2017-09-08 RX ORDER — OXYCODONE HYDROCHLORIDE 30 MG/1
30 TABLET ORAL 4 TIMES DAILY
Qty: 120 TAB | Refills: 0 | Status: SHIPPED | OUTPATIENT
Start: 2017-11-04 | End: 2017-09-08 | Stop reason: SDUPTHER

## 2017-09-08 NOTE — PROGRESS NOTES
HISTORY OF PRESENT ILLNESS  Toño Anthony is a 73 y.o. female. Patient presents for follow up of chronic, severe pain which is widespread and multifocal and includes peripheral neuropathic pain and polyarthralgias. She also suffers with chronic neck pain due to cervical DDD and postlaminectomy syndrome. She continues to complain of pain \"all over\", but lately has been quite severe in the lower legs and feet. She reports that chronic edema has been progressively worsening, with pain becoming almost untenable. This pain is described as \"pin pricking\", tender, and aching. Symptoms worsen with prolonged sitting and standing. She had dopplers ordered by her vascular specialist, which revealed the following:   Venous valvular reflux >1000 ms in the right femoral and popliteal veins. Venous valvular reflux >1000 ms  in the left common femoral and popliteal veins. Non vascularized collection as described. Isolated right great saphenous vein reflux >500 ms at the level of the saphenofemoral junction and proximal thigh. Right full length small saphenous vein reflux > 500 ms including the saphenous popliteal junction.  No perforators >2.0 mm are identified in the right lower extremity.  Varicose veins in the right lower extremity > 6.0 mm. Isolated left great saphenous vein reflux >500 ms at the level of the proximal thigh. Isolated left small saphenous vein reflux >500 ms at the level of the popliteal fossa and proximal calf. Competent  in the left lower extremity with reflux <350 ms.  Varicose veins in the left lower extremity > 6.0 mm. Her vascular surgeon, for reasons unclear, has recommended against any interventions or surgeries to address her extensive veinous reflux. She was encouraged to seek a second opinion if symptoms worsen. She is also under the care of ortho for chronic bilateral knee pain due to OA, and recently underwent cortisone injections, which were modestly helpful.   Neck pain has actually improved significantly since her recent cervical ACDF with Dr. Simeon Hassan three months ago. She has \"no pain\" in the neck or upper extremities, and is \"thrilled\" with her outcome. We discussed the critical role of exercise in the management of osteoarthritic-related fatigue and pain. Pt reports average of 2/10 pain scale most days with medications. Medications continue to work well for pain control overall. Aury Gao is tolerating medications well, with no untoward side effects noted. She is able to stay more active with less discomfort with these current doses. The patient reports an average of 80% relief with this regimen. Most recent UDS and  were consistent with prescribed medications. Pill counts are appropriate. She is informed of side effects, risks, and benefits of this regimen, and emphasizes that she derives a significant improvement in functionality and quality of life, and notes that non-opioid medications and therapies in the past have not offered significant benefit. A total of 50 minutes was spent with the patient of which more than 50% of the time was spent counseling the patient. HPI--see above    ROS  Constitutional: Positive for malaise/fatigue. Gastrointestinal: Positive for constipation, heartburn and nausea. Musculoskeletal: Positive for back pain, joint pain (polyarticular), myalgias and neck pain. Neurological: Positive for weakness (generalized). Psychiatric/Behavioral: The patient has insomnia. All other systems reviewed and are negative. Physical Exam  Constitutional: She is oriented to person, place, and time. She appears cachectic. She has a sickly appearance. She appears distressed. HENT:   Head: Normocephalic and atraumatic. Right Ear: External ear normal.   Left Ear: External ear normal.   Nose: Nose normal.   Mouth/Throat: Oropharynx is clear and moist. No oropharyngeal exudate.    Eyes: Conjunctivae and EOM are normal. Pupils are equal, round, and reactive to light. Right eye exhibits no discharge. Left eye exhibits no discharge. No scleral icterus. Neck: Muscular tenderness: spasm. Decreased range of motion present. No Brudzinski's sign and no Kernig's sign noted. No thyromegaly present. Cardiovascular: Normal rate, regular rhythm and normal heart sounds. Pulmonary/Chest: Effort normal and breath sounds normal. No respiratory distress. She has no wheezes. She has no rales. Abdominal: Soft. She exhibits no distension. There is no tenderness. There is no rebound and no guarding. Musculoskeletal: She exhibits tenderness. 16/18 trigger points tender         Right knee: She exhibits decreased range of motion. Tenderness found. Left knee: She exhibits decreased range of motion. Tenderness found. Right ankle: She exhibits decreased range of motion. Tenderness. Left ankle: She exhibits decreased range of motion. Tenderness. Lumbar back: She exhibits decreased range of motion, tenderness, pain and spasm. Neurological: She is alert and oriented to person, place, and time. She has normal reflexes. No cranial nerve deficit or sensory deficit. She exhibits normal muscle tone. Gait abnormal. Coordination normal.   Skin: Skin is warm. No rash noted. Psychiatric: Her speech is normal and behavior is normal. Judgment and thought content normal. She exhibits a depressed mood. ASSESSMENT and PLAN    ICD-10-CM ICD-9-CM    1. Bilateral leg and foot pain M79.604 729.5     M79.605      M79.671      M79.672     2. Saphenofemoral venous reflux I87.2 459.81    3. Bilateral leg edema R60.0 782.3    4. Neuralgia and neuritis M79.2 729.2    5. Idiopathic peripheral neuropathy G60.9 356.9    6. Peripheral vascular disease (HCC) I73.9 443.9    7. DDD (degenerative disc disease), cervical M50.30 722.4    8. Chronic pain syndrome G89.4 338.4    9. S/P cervical spinal fusion Z98.1 V45.4    10.  Fibromyalgia M79.7 729.1 Plan:  Continue same medications as prescribed for chronic pain  Follow up in 4 months or sooner if needed  Regular exercise and attention to emotional health and diet remain the most effective ways to treat chronic pain of all kinds  You may contact me with questions or concerns through [address]

## 2017-09-08 NOTE — MR AVS SNAPSHOT
Visit Information Date & Time Provider Department Dept. Phone Encounter #  
 9/8/2017 11:00 AM [facility] [phone] [id number] Follow-up Instructions Return in about 4 months (around 1/8/2018). Follow-up and Disposition History Upcoming Health Maintenance Date Due DTaP/Tdap/Td series (1 - Tdap) 9/25/1972 BREAST CANCER SCRN MAMMOGRAM 9/25/2001 ZOSTER VACCINE AGE 60> 7/25/2011 GLAUCOMA SCREENING Q2Y 9/25/2016 Pneumococcal 65+ Low/Medium Risk (1 of 2 - PCV13) 9/25/2016 MEDICARE YEARLY EXAM 9/25/2016 INFLUENZA AGE 9 TO ADULT 8/1/2017 COLONOSCOPY 11/7/2021 Allergies as of 9/8/2017  Review Complete On: 9/8/2017 By: Avtar Ferrer LPN Severity Noted Reaction Type Reactions Incivek [Telaprevir] High 02/23/2017    Anaphylaxis Demerol [Meperidine]  08/01/2012    Itching, Swelling Current Immunizations  Never Reviewed No immunizations on file. Not reviewed this visit You Were Diagnosed With   
  
 Codes Comments Bilateral leg and foot pain    -  Primary ICD-10-CM: M79.604, M79.605, M79.671, Y33.993 ICD-9-CM: 729.5 Saphenofemoral venous reflux     ICD-10-CM: I87.2 ICD-9-CM: 459.81 Bilateral leg edema     ICD-10-CM: R60.0 ICD-9-CM: 782.3 Neuralgia and neuritis     ICD-10-CM: M79.2 ICD-9-CM: 729.2 Idiopathic peripheral neuropathy     ICD-10-CM: G60.9 ICD-9-CM: 356.9 Peripheral vascular disease ([facility] [address]     ICD-10-CM: I73.9 ICD-9-CM: 443.9 DDD (degenerative disc disease), cervical     ICD-10-CM: M50.30 ICD-9-CM: 722.4 Chronic pain syndrome     ICD-10-CM: G89.4 ICD-9-CM: 338.4 S/P cervical spinal fusion     ICD-10-CM: Z98.1 ICD-9-CM: V45.4 Fibromyalgia     ICD-10-CM: M79.7 ICD-9-CM: 729.1 Vitals BP Pulse Temp Resp Weight(growth percentile) BMI  
 161/82 82 97.1 °F (36.2 °C) 20 162 lb (73.5 kg) 27.81 kg/m2 OB Status Smoking Status Hysterectomy Never Smoker Vitals History BMI and BSA Data Body Mass Index Body Surface Area  
 27.81 kg/m 2 1.82 m 2 Preferred Pharmacy Pharmacy Name Phone [phone] [phone] Your Updated Medication List  
  
   
This list is accurate as of: 9/8/17 12:56 PM.  Always use your most recent med list.  
  
  
  
  
 AMBIEN 10 mg tablet Generic drug:  zolpidem Take  by mouth nightly as needed. busPIRone 15 mg tablet Commonly known as:  BUSPAR Take 15 mg by mouth three (3) times daily. Indications: GENERALIZED ANXIETY DISORDER  
  
 CYMBALTA PO Take 60 mg by mouth daily. FOSAMAX 70 mg tablet Generic drug:  alendronate Take  by mouth Every Saturday. hydrOXYzine pamoate 25 mg capsule Commonly known as:  VISTARIL Take 25 mg by mouth three (3) times daily as needed for Itching. multivitamin, tx-iron-ca-min 9 mg iron-400 mcg Tab tablet Commonly known as:  THERA-M w/ IRON Take 1 Tab by mouth daily. naloxone 4 mg/actuation nasal spray Commonly known as:  NARCAN  
4 mg by Nasal route once as needed (for opioid overdose) for up to 1 dose. Indications: OPIOID TOXICITY  
  
 oxyCODONE IR 30 mg immediate release tablet Commonly known as:  Reji Au Take 1 Tab by mouth four (4) times daily for 30 days. Max Daily Amount: 120 mg. Indications: Pain Start taking on:  1/2/2018  
  
 rOPINIRole 1 mg tablet Commonly known as:  Ebbie Locks Take 0.25 mg by mouth nightly. Indications: Restless Legs Syndrome, 3 hours prior to bedtime SENNA PLUS PO Take  by mouth nightly. SYNTHROID PO Take 88 mcg by mouth daily. Indications: hypo  
  
 valACYclovir 1 gram tablet Commonly known as:  VALTREX Take 1 Tab by mouth daily. VITAMIN B-12 500 mcg tablet Generic drug:  cyanocobalamin Take 500 mcg by mouth two (2) times a day. VITAMIN C 500 mg tablet Generic drug:  ascorbic acid (vitamin C) Take  by mouth two (2) times a day. VITAMIN D3 1,000 unit tablet Generic drug:  cholecalciferol Take  by mouth daily. Prescriptions Printed Refills  
 oxyCODONE IR (ROXICODONE) 30 mg immediate release tablet 0 Starting on: 1/2/2018 Sig: Take 1 Tab by mouth four (4) times daily for 30 days. Max Daily Amount: 120 mg. Indications: Pain Class: Print Route: Oral  
  
Follow-up Instructions Return in about 4 months (around 1/8/2018). Patient Instructions Plan: 
Continue same medications as prescribed for chronic pain Follow up in 4 months or sooner if needed Regular exercise and attention to emotional health and diet remain the most effective ways to treat chronic pain of all kinds You may contact me with questions or concerns through [address] Introducing [facility] & HEALTH SERVICES! Bettina Milton introduces [employer] patient portal. Now you can access parts of your medical record, email your doctor's office, and request medication refills online. 1. In your internet browser, go to [email or web address] 2. Click on the First Time User? Click Here link in the Sign In box. You will see the New Member Sign Up page. 3. Enter your Coeur[employer] Access Code exactly as it appears below. You will not need to use this code after youve completed the sign-up process. If you do not sign up before the expiration date, you must request a new code. · [employer] Access Code: [email or web address] Expires: 9/19/2017 11:38 AM 
 
4. Enter the last four digits of your Social Security Number (xxxx) and Date of Birth (mm/dd/yyyy) as indicated and click Submit. You will be taken to the next sign-up page. 5. Create a Melon Powert ID. This will be your Coeur[employer] login ID and cannot be changed, so think of one that is secure and easy to remember. 6. Create a Melon Powert password. You can change your password at any time. 7. Enter your Password Reset Question and Answer. This can be used at a later time if you forget your password. 8. Enter your e-mail address. You will receive e-mail notification when new information is available in [address] 9. Click Sign Up. You can now view and download portions of your medical record. 10. Click the Download Summary menu link to download a portable copy of your medical information. If you have questions, please visit the Frequently Asked Questions section of the Collisionable website. Remember, Collisionable is NOT to be used for urgent needs. For medical emergencies, dial 911. Now available from your iPhone and Android! Please provide this summary of care documentation to your next provider. Your primary care clinician is listed as Kp Duke. If you have any questions after today's visit, please call [phone].

## 2017-09-08 NOTE — PROGRESS NOTES
Nursing Notes    Patient presents to the office today in follow-up. Patient rates her pain at 8/10 on the numerical pain scale. Reviewed medications with counts as follows:    Rx Date filled Qty Dispensed Pill Count Last Dose Short   Oxycodone 30mg 9/7/17 120 116 Last night  No                                           Comments:     POC UDS was not performed in office today    Any new labs or imaging since last appointment? YES; c-spine and bilateral knee x-rays    Have you been to an emergency room (ER) or urgent care clinic since your last visit? YES ; [facility] emergency dept due to cold symptoms           Have you been hospitalized since your last visit? NO     If yes, where, when, and reason for visit? Have you seen or consulted any other health care providers outside of the [address] [place] [address]  since your last visit? YES     If yes, where, when, and reason for visit? Vascular , orthopedist, and emergency dept physicians, and pcp       HM deferred to pcp.

## 2018-01-23 ENCOUNTER — TELEPHONE (OUTPATIENT)
Dept: PAIN MANAGEMENT | Age: 68
End: 2018-01-23

## 2018-01-23 NOTE — TELEPHONE ENCOUNTER
Patient called the triage line and stated that she needs an appt. It is noted that the patient has missed to appts this month that she missed. I will call the patient and inform her that she needs to call the office for cancellations. We are booked at this time at the office. The patient was called and informed she needs to call the office for cancellations daily. The patient verbalized understanding of the phone call.

## 2018-01-25 ENCOUNTER — OFFICE VISIT (OUTPATIENT)
Dept: PAIN MANAGEMENT | Age: 68
End: 2018-01-25

## 2018-01-25 VITALS
WEIGHT: 165 LBS | HEART RATE: 76 BPM | BODY MASS INDEX: 28.32 KG/M2 | RESPIRATION RATE: 20 BRPM | TEMPERATURE: 97.4 F | DIASTOLIC BLOOD PRESSURE: 77 MMHG | SYSTOLIC BLOOD PRESSURE: 165 MMHG

## 2018-01-25 DIAGNOSIS — M79.671 PAIN IN BOTH FEET: Primary | ICD-10-CM

## 2018-01-25 DIAGNOSIS — M79.672 PAIN IN BOTH FEET: Primary | ICD-10-CM

## 2018-01-25 DIAGNOSIS — M79.2 NEURALGIA AND NEURITIS: ICD-10-CM

## 2018-01-25 DIAGNOSIS — G60.9 IDIOPATHIC PERIPHERAL NEUROPATHY: ICD-10-CM

## 2018-01-25 DIAGNOSIS — G89.4 CHRONIC PAIN SYNDROME: ICD-10-CM

## 2018-01-25 RX ORDER — OXYCODONE HYDROCHLORIDE 30 MG/1
30 TABLET ORAL 4 TIMES DAILY
Qty: 120 TAB | Refills: 0 | Status: SHIPPED | OUTPATIENT
Start: 2018-03-23 | End: 2018-04-26 | Stop reason: SDUPTHER

## 2018-01-25 RX ORDER — OXYCODONE HYDROCHLORIDE 30 MG/1
30 TABLET ORAL 4 TIMES DAILY
Qty: 120 TAB | Refills: 0 | Status: SHIPPED | OUTPATIENT
Start: 2018-02-24 | End: 2018-03-26

## 2018-01-25 RX ORDER — OXYCODONE HYDROCHLORIDE 30 MG/1
30 TABLET ORAL 4 TIMES DAILY
Qty: 120 TAB | Refills: 0 | Status: SHIPPED | OUTPATIENT
Start: 2018-01-25 | End: 2018-02-24

## 2018-01-25 NOTE — PROGRESS NOTES
HISTORY OF PRESENT ILLNESS  Mckenna Barahona is a 78 y.o. female. HPI Comments: The patient's care with this clinic has been under the direction of Dr. Neha Byers. I have reviewed medical information today including progress notes. Review of past treatments, past medications, current medications. Review of diagnoses. I have obtained history from the patient today as well. There have been changes concerning pain management across our country and in the state of [place]. Also, changes within our own clinic. Nursing discussed these changes with the patient. I also spoke with the patient on these matters today. The patient's questions were answered. The patient is aware that Dr. Neha Byers is no longer with this clinic. The patient is also aware that they are free to choose a different pain clinic if they wish and our clinic will help transition them if necessary. Chronic pain and opioids. Also, when appropriate, the patient will receive a copy of a research study, titled- Benzodiazepines and risk of all cause mortality in adults. -Visit survey reviewed  Chief complaint- chronic pain syndrome- pain to both feet, history of painful neuropathy  She will continue with Roxicodone 30 mg 4 times a day as needed  CymbaltaTanyaien prescribed by other clinics  I believe Cymbalta is prescribed by psychiatry.  -The visit survey indicates that medications help general activity, mood, walking, sleep, enjoyment of life. The patient will continue medications. No new significant side effects reported  Medication continues to help improve quality of life. Medications reviewed including risk and benefits. Recent average level of pain-3        Review of Systems   Constitutional: Positive for malaise/fatigue. HENT: Negative for ear discharge and ear pain. Gastrointestinal: Positive for constipation, heartburn and nausea. Musculoskeletal: Positive for back pain, joint pain (polyarticular), myalgias and neck pain. Neurological: Positive for weakness (generalized). Negative for seizures and loss of consciousness. Psychiatric/Behavioral: Negative for hallucinations and suicidal ideas. The patient has insomnia. All other systems reviewed and are negative. Physical Exam   Constitutional: She appears well-developed and well-nourished. She is cooperative. She does not have a sickly appearance. HENT:   Head: Normocephalic and atraumatic. Right Ear: External ear normal. No drainage. Left Ear: External ear normal. No drainage. Nose: Nose normal.   Eyes: Lids are normal. Right eye exhibits no discharge. Left eye exhibits no discharge. Right conjunctiva has no hemorrhage. Left conjunctiva has no hemorrhage. Neck: Neck supple. No tracheal deviation present. No thyroid mass present. Pulmonary/Chest: Effort normal. No respiratory distress. Neurological: She is alert. No cranial nerve deficit. Skin: Skin is intact. No rash noted. Psychiatric: Her speech is normal. Her affect is not angry. She does not express inappropriate judgment. Nursing note and vitals reviewed. ASSESSMENT and PLAN  Encounter Diagnoses   Name Primary?  Pain in both feet Yes    Idiopathic peripheral neuropathy     Chronic pain syndrome     Neuralgia and neuritis    No indicators for recent medication misuse.  reviewed. Pain Meds and Quality Of Life have been reviewed. Nonpharmacologic therapy and non-opioid pharmacologic therapy were considered. If opioid therapy is prescribed, this is only if the expected benefits are anticipated to outweigh risks. Possible changes to treatment plan considered. Support/education given as needed. Today-medications are as listed. No significant changes to medications. Follow up -- 3 months.

## 2018-01-25 NOTE — PROGRESS NOTES
Nursing Notes    Patient presents to the office today in follow-up. Patient rates her pain at 3/10 on the numerical pain scale. Reviewed medications with counts as follows:    Rx Date filled Qty Dispensed Pill Count Last Dose Short   Oxycodone 30mg 01/05/18 120 39 This am  No                                           Comments:     POC UDS was not performed in office today    Any new labs or imaging since last appointment? YES; labwork and MRI of head     Have you been to an emergency room (ER) or urgent care clinic since your last visit? YES            Have you been hospitalized since your last visit? NO     If yes, where, when, and reason for visit? Have you seen or consulted any other health care providers outside of the [address] [place] [address]  since your last visit? YES     If yes, where, when, and reason for visit? Emergency dept physicians, pcp , psychiatrist       HM deferred to pcp.

## 2018-01-25 NOTE — MR AVS SNAPSHOT
[address] [facility] [address] 
[phone] Patient: Yury Escobar MRN: [id number] Visit Information Date & Time Provider Department Dept. Phone Encounter #  
 1/25/2018 10:45 AM Dominga Schrader MD [address] for Pain Management  Follow-up Instructions Return in about 3 months (around 4/25/2018). Follow-up and Disposition History Upcoming Health Maintenance Date Due DTaP/Tdap/Td series (1 - Tdap) 9/25/1972 BREAST CANCER SCRN MAMMOGRAM 9/25/2001 ZOSTER VACCINE AGE 60> 7/25/2011 GLAUCOMA SCREENING Q2Y 9/25/2016 Pneumococcal 65+ Low/Medium Risk (1 of 2 - PCV13) 9/25/2016 MEDICARE YEARLY EXAM 9/25/2016 Influenza Age 5 to Adult 8/1/2017 COLONOSCOPY 11/7/2021 Allergies as of 1/25/2018  Review Complete On: 1/25/2018 By: Dominga Schrader MD  
  
 Severity Noted Reaction Type Reactions Incivek [Telaprevir] High 02/23/2017    Anaphylaxis Demerol [Meperidine]  08/01/2012    Itching, Swelling Current Immunizations  Never Reviewed No immunizations on file. Not reviewed this visit You Were Diagnosed With   
  
 Codes Comments Pain in both feet    -  Primary ICD-10-CM: M79.671, P35.739 ICD-9-CM: 729.5 Idiopathic peripheral neuropathy     ICD-10-CM: G60.9 ICD-9-CM: 356.9 Chronic pain syndrome     ICD-10-CM: G89.4 ICD-9-CM: 338.4 Neuralgia and neuritis     ICD-10-CM: M79.2 ICD-9-CM: 729.2 Vitals BP Pulse Temp Resp Weight(growth percentile) BMI  
 165/77 (BP 1 Location: Right arm, BP Patient Position: Sitting) 76 97.4 °F (36.3 °C) (Oral) 20 165 lb (74.8 kg) 28.32 kg/m2 OB Status Smoking Status Hysterectomy Never Smoker Vitals History BMI and BSA Data Body Mass Index Body Surface Area  
 28.32 kg/m 2 1.84 m 2 Preferred Pharmacy Pharmacy Name Phone [facility] [id number] [phone] Your Updated Medication List  
  
   
This list is accurate as of: 1/25/18 11:39 AM.  Always use your most recent med list.  
  
  
  
  
 AMBIEN 10 mg tablet Generic drug:  zolpidem Take  by mouth nightly as needed. busPIRone 15 mg tablet Commonly known as:  BUSPAR Take 15 mg by mouth three (3) times daily. Indications: GENERALIZED ANXIETY DISORDER  
  
 CYMBALTA PO Take 60 mg by mouth daily. FOSAMAX 70 mg tablet Generic drug:  alendronate Take  by mouth Every Saturday. hydrOXYzine pamoate 25 mg capsule Commonly known as:  VISTARIL Take 25 mg by mouth three (3) times daily as needed for Itching. multivitamin, tx-iron-ca-min 9 mg iron-400 mcg Tab tablet Commonly known as:  THERA-M w/ IRON Take 1 Tab by mouth daily. naloxone 4 mg/actuation nasal spray Commonly known as:  NARCAN  
4 mg by Nasal route once as needed (for opioid overdose) for up to 1 dose. Indications: OPIOID TOXICITY  
  
 * oxyCODONE IR 30 mg immediate release tablet Commonly known as:  Suzie Laundry Take 1 Tab by mouth four (4) times daily for 30 days. Max Daily Amount: 120 mg. Indications: Pain * oxyCODONE IR 30 mg immediate release tablet Commonly known as:  Suzie Laundry Take 1 Tab by mouth four (4) times daily for 30 days. Max Daily Amount: 120 mg. Indications: Pain Start taking on:  2/24/2018 * oxyCODONE IR 30 mg immediate release tablet Commonly known as:  Suzie Laundry Take 1 Tab by mouth four (4) times daily for 30 days. Max Daily Amount: 120 mg. Indications: Pain Start taking on:  3/23/2018  
  
 rOPINIRole 1 mg tablet Commonly known as:  Juan Whitney Take 0.25 mg by mouth nightly. Indications: Restless Legs Syndrome, 3 hours prior to bedtime SENNA PLUS PO Take  by mouth nightly. SYNTHROID PO Take 88 mcg by mouth daily. Indications: hypo  
  
 valACYclovir 1 gram tablet Commonly known as:  VALTREX Take 1 Tab by mouth daily. VITAMIN B-12 500 mcg tablet Generic drug:  cyanocobalamin Take 500 mcg by mouth two (2) times a day. VITAMIN C 500 mg tablet Generic drug:  ascorbic acid (vitamin C) Take  by mouth two (2) times a day. VITAMIN D3 1,000 unit tablet Generic drug:  cholecalciferol Take  by mouth daily. * Notice: This list has 3 medication(s) that are the same as other medications prescribed for you. Read the directions carefully, and ask your doctor or other care provider to review them with you. Prescriptions Printed Refills  
 oxyCODONE IR (ROXICODONE) 30 mg immediate release tablet 0 Sig: Take 1 Tab by mouth four (4) times daily for 30 days. Max Daily Amount: 120 mg. Indications: Pain Class: Print Route: Oral  
 oxyCODONE IR (ROXICODONE) 30 mg immediate release tablet 0 Starting on: 3/23/2018 Sig: Take 1 Tab by mouth four (4) times daily for 30 days. Max Daily Amount: 120 mg. Indications: Pain Class: Print Route: Oral  
 oxyCODONE IR (ROXICODONE) 30 mg immediate release tablet 0 Starting on: 2/24/2018 Sig: Take 1 Tab by mouth four (4) times daily for 30 days. Max Daily Amount: 120 mg. Indications: Pain Class: Print Route: Oral  
  
Follow-up Instructions Return in about 3 months (around 4/25/2018). Introducing [facility] & HE[facility]! Lulu Barnes introduces [employer] patient portal. Now you can access parts of your medical record, email your doctor's office, and request medication refills online. 1. In your internet browser, go to [email or web address] 2. Click on the First Time User? Click Here link in the Sign In box. You will see the New Member Sign Up page. 3. Enter your [employer] Access Code exactly as it appears below. You will not need to use this code after youve completed the sign-up process.  If you do not sign up before the expiration date, you must request a new code. · [employer] Access Code: [email or web address] Expires: 4/25/2018 11:39 AM 
 
4. Enter the last four digits of your Social Security Number (xxxx) and Date of Birth (mm/dd/yyyy) as indicated and click Submit. You will be taken to the next sign-up page. 5. Create a [employer] ID. This will be your [employer] login ID and cannot be changed, so think of one that is secure and easy to remember. 6. Create a [employer] password. You can change your password at any time. 7. Enter your Password Reset Question and Answer. This can be used at a later time if you forget your password. 8. Enter your e-mail address. You will receive e-mail notification when new information is available in [address] 9. Click Sign Up. You can now view and download portions of your medical record. 10. Click the Download Summary menu link to download a portable copy of your medical information. If you have questions, please visit the Frequently Asked Questions section of the [employer] website. Remember, [employer] is NOT to be used for urgent needs. For medical emergencies, dial 911. Now available from your iPhone and Android! Please provide this summary of care documentation to your next provider. Your primary care clinician is listed as Emilee Mode. If you have any questions after today's visit, please call [phone].

## 2018-04-26 ENCOUNTER — OFFICE VISIT (OUTPATIENT)
Dept: PAIN MANAGEMENT | Age: 68
End: 2018-04-26

## 2018-04-26 VITALS
WEIGHT: 165 LBS | RESPIRATION RATE: 16 BRPM | BODY MASS INDEX: 28.17 KG/M2 | SYSTOLIC BLOOD PRESSURE: 175 MMHG | TEMPERATURE: 98.6 F | DIASTOLIC BLOOD PRESSURE: 88 MMHG | HEART RATE: 75 BPM | HEIGHT: 64 IN

## 2018-04-26 DIAGNOSIS — G89.4 CHRONIC PAIN SYNDROME: ICD-10-CM

## 2018-04-26 DIAGNOSIS — G60.9 IDIOPATHIC PERIPHERAL NEUROPATHY: ICD-10-CM

## 2018-04-26 DIAGNOSIS — M79.2 NEURALGIA AND NEURITIS: ICD-10-CM

## 2018-04-26 DIAGNOSIS — M25.50 MULTIPLE JOINT PAIN: Primary | ICD-10-CM

## 2018-04-26 DIAGNOSIS — M79.672 BILATERAL LEG AND FOOT PAIN: ICD-10-CM

## 2018-04-26 DIAGNOSIS — Z79.899 ENCOUNTER FOR LONG-TERM (CURRENT) USE OF HIGH-RISK MEDICATION: ICD-10-CM

## 2018-04-26 DIAGNOSIS — M79.604 BILATERAL LEG AND FOOT PAIN: ICD-10-CM

## 2018-04-26 DIAGNOSIS — M79.671 BILATERAL LEG AND FOOT PAIN: ICD-10-CM

## 2018-04-26 DIAGNOSIS — M79.605 BILATERAL LEG AND FOOT PAIN: ICD-10-CM

## 2018-04-26 DIAGNOSIS — M50.30 DDD (DEGENERATIVE DISC DISEASE), CERVICAL: ICD-10-CM

## 2018-04-26 DIAGNOSIS — M79.672 PAIN IN BOTH FEET: ICD-10-CM

## 2018-04-26 DIAGNOSIS — M79.671 PAIN IN BOTH FEET: ICD-10-CM

## 2018-04-26 LAB
ALCOHOL UR POC: NORMAL
AMPHETAMINES UR POC: NEGATIVE
BARBITURATES UR POC: NORMAL
BENZODIAZEPINES UR POC: NEGATIVE
BUPRENORPHINE UR POC: NEGATIVE
CANNABINOIDS UR POC: NEGATIVE
CARISOPRODOL UR POC: NORMAL
COCAINE UR POC: NEGATIVE
FENTANYL UR POC: NORMAL
MDMA/ECSTASY UR POC: NORMAL
METHADONE UR POC: NEGATIVE
METHAMPHETAMINE UR POC: NORMAL
METHYLPHENIDATE UR POC: NORMAL
OPIATES UR POC: NORMAL
OXYCODONE UR POC: NORMAL
PHENCYCLIDINE UR POC: NORMAL
PROPOXYPHENE UR POC: NORMAL
TRAMADOL UR POC: NORMAL
TRICYCLICS UR POC: NORMAL

## 2018-04-26 RX ORDER — OXYCODONE HYDROCHLORIDE 20 MG/1
20 TABLET ORAL
Qty: 120 TAB | Refills: 0 | Status: SHIPPED | OUTPATIENT
Start: 2018-05-01 | End: 2018-05-23 | Stop reason: SDUPTHER

## 2018-04-26 NOTE — PATIENT INSTRUCTIONS
1. Modify current plan with no evidence of addiction or diversion. Stable on current medication without adverse events. 2. Refill and adjust oxycodone 30 mg down to 20 mg. Please take half to 1 tablet up to 4 times daily on an as-needed basis only. 3. Please remember to call at least 34 business days prior to medication refill. 4. Please discuss alternative treatments for sleep with your psychiatrist as soon as possible as we would no longer be able to write opioid medications while you are concurrently using benzodiazepines. This includes Ambien. 5. Naloxone 4 mg nasal spray for opioid induced respiratory depression emergency only. 6. Discussed risks of addiction, dependency, and opioid induced hyperalgesia. 7. Return to clinic in 3 months. Please call and cancel this appointment as well as your pain management agreement if you do decide to transition her care.

## 2018-04-26 NOTE — MR AVS SNAPSHOT
[address] [facility] [address] 
[phone] Patient: Marielos Speaker MRN: [id number] Visit Information Date & Time Provider Department Dept. Phone Encounter #  
 4/26/2018 10:00 AM Lindsay Dunn, [address] [place] [address] for Pain Management [phone] [id number] Follow-up Instructions Return in about 3 months (around 7/26/2018). Upcoming Health Maintenance Date Due DTaP/Tdap/Td series (1 - Tdap) 9/25/1972 BREAST CANCER SCRN MAMMOGRAM 9/25/2001 ZOSTER VACCINE AGE 60> 7/25/2011 GLAUCOMA SCREENING Q2Y 9/25/2016 Bone Densitometry (Dexa) Screening 9/25/2016 Pneumococcal 65+ Low/Medium Risk (1 of 2 - PCV13) 9/25/2016 MEDICARE YEARLY EXAM 3/14/2018 COLONOSCOPY 11/7/2021 Allergies as of 4/26/2018  Review Complete On: 4/26/2018 By: DONAL Randall Severity Noted Reaction Type Reactions Incivek [Telaprevir] High 02/23/2017    Anaphylaxis Demerol [Meperidine]  08/01/2012    Itching, Swelling Current Immunizations  Never Reviewed No immunizations on file. Not reviewed this visit You Were Diagnosed With   
  
 Codes Comments Multiple joint pain    -  Primary ICD-10-CM: M25.50 ICD-9-CM: 719.49 Encounter for long-term (current) use of high-risk medication     ICD-10-CM: Z79.899 ICD-9-CM: V58.69 Pain in both feet     ICD-10-CM: M79.671, B98.366 ICD-9-CM: 729.5 Idiopathic peripheral neuropathy     ICD-10-CM: G60.9 ICD-9-CM: 356.9 Chronic pain syndrome     ICD-10-CM: G89.4 ICD-9-CM: 338.4 Neuralgia and neuritis     ICD-10-CM: M79.2 ICD-9-CM: 729.2 DDD (degenerative disc disease), cervical     ICD-10-CM: M50.30 ICD-9-CM: 722.4 Bilateral leg and foot pain     ICD-10-CM: M79.604, M79.605, M79.671, Y04.300 ICD-9-CM: 729.5 Vitals BP Pulse Temp Resp Height(growth percentile) Weight(growth percentile) 175/88 (BP 1 Location: Left arm, BP Patient Position: Sitting) 75 98.6 °F (37 °C) 16 5' 4\" (1.626 m) 165 lb (74.8 kg) BMI OB Status Smoking Status 28.32 kg/m2 Hysterectomy Never Smoker Vitals History BMI and BSA Data Body Mass Index Body Surface Area  
 28.32 kg/m 2 1.84 m 2 Preferred Pharmacy Pharmacy Name Phone [facility] [phone] [place] [phone] Your Updated Medication List  
  
   
This list is accurate as of 4/26/18 10:56 AM.  Always use your most recent med list.  
  
  
  
  
 AMBIEN 10 mg tablet Generic drug:  zolpidem Take  by mouth nightly as needed. busPIRone 15 mg tablet Commonly known as:  BUSPAR Take 15 mg by mouth three (3) times daily. Indications: GENERALIZED ANXIETY DISORDER  
  
 CYMBALTA PO Take 60 mg by mouth daily. FOSAMAX 70 mg tablet Generic drug:  alendronate Take  by mouth Every Saturday. hydrOXYzine pamoate 25 mg capsule Commonly known as:  VISTARIL Take 25 mg by mouth three (3) times daily as needed for Itching. multivitamin, tx-iron-ca-min 9 mg iron-400 mcg Tab tablet Commonly known as:  THERA-M w/ IRON Take 1 Tab by mouth daily. naloxone 4 mg/actuation nasal spray Commonly known as:  NARCAN  
4 mg by Nasal route once as needed (for opioid overdose) for up to 1 dose. Indications: OPIOID TOXICITY  
  
 oxyCODONE IR 20 mg immediate release tablet Commonly known as:  Al Kays Take 1 Tab by mouth four (4) times daily as needed for Pain for up to 30 days. Max Daily Amount: 80 mg. Indications: Pain Start taking on:  5/1/2018  
  
 rOPINIRole 1 mg tablet Commonly known as:  Percy Bouillon Take 0.25 mg by mouth nightly. Indications: Restless Legs Syndrome, 3 hours prior to bedtime SENNA PLUS PO Take  by mouth nightly. SYNTHROID PO Take 88 mcg by mouth daily. Indications: hypo  
  
 valACYclovir 1 gram tablet Commonly known as:  VALTREX Take 1 Tab by mouth daily. VITAMIN B-12 500 mcg tablet Generic drug:  cyanocobalamin Take 500 mcg by mouth two (2) times a day. VITAMIN C 500 mg tablet Generic drug:  ascorbic acid (vitamin C) Take  by mouth two (2) times a day. VITAMIN D3 1,000 unit tablet Generic drug:  cholecalciferol Take  by mouth daily. Prescriptions Printed Refills  
 oxyCODONE IR (ROXICODONE) 20 mg immediate release tablet 0 Starting on: 5/1/2018 Sig: Take 1 Tab by mouth four (4) times daily as needed for Pain for up to 30 days. Max Daily Amount: 80 mg. Indications: Pain Class: Print Route: Oral  
  
We Performed the Following AMB POC DRUG SCREEN () [ [facility] [place]] DRUG SCREEN [LAB36194 Custom] Follow-up Instructions Return in about 3 months (around 7/26/2018). Patient Instructions 1. Modify current plan with no evidence of addiction or diversion. Stable on current medication without adverse events. 2. Refill and adjust oxycodone 30 mg down to 20 mg. Please take half to 1 tablet up to 4 times daily on an as-needed basis only. 3. Please remember to call at least 34 business days prior to medication refill. 4. Please discuss alternative treatments for sleep with your psychiatrist as soon as possible as we would no longer be able to write opioid medications while you are concurrently using benzodiazepines. This includes Ambien. 5. Naloxone 4 mg nasal spray for opioid induced respiratory depression emergency only. 6. Discussed risks of addiction, dependency, and opioid induced hyperalgesia. 7. Return to clinic in 3 months. Please call and cancel this appointment as well as your pain management agreement if you do decide to transition her care. Introducing [facility]!    
 Ping Kaiser introduces [employer] patient portal. Now you can access parts of your medical record, email your doctor's office, and request medication refills online. 1. In your internet browser, go to [email or web address] 2. Click on the First Time User? Click Here link in the Sign In box. You will see the New Member Sign Up page. 3. Enter your [employer] Access Code exactly as it appears below. You will not need to use this code after youve completed the sign-up process. If you do not sign up before the expiration date, you must request a new code. · [employer] Access Code: [email or web address] Expires: 7/25/2018 10:56 AM 
 
4. Enter the last four digits of your Social Security Number (xxxx) and Date of Birth (mm/dd/yyyy) as indicated and click Submit. You will be taken to the next sign-up page. 5. Create a [employer] ID. This will be your [employer] login ID and cannot be changed, so think of one that is secure and easy to remember. 6. Create a [employer] password. You can change your password at any time. 7. Enter your Password Reset Question and Answer. This can be used at a later time if you forget your password. 8. Enter your e-mail address. You will receive e-mail notification when new information is available in [address] 9. Click Sign Up. You can now view and download portions of your medical record. 10. Click the Download Summary menu link to download a portable copy of your medical information. If you have questions, please visit the Frequently Asked Questions section of the [employer] website. Remember, [employer] is NOT to be used for urgent needs. For medical emergencies, dial 911. Now available from your iPhone and Android! Please provide this summary of care documentation to your next provider. Your primary care clinician is listed as [place] [employer]. If you have any questions after today's visit, please call [phone].

## 2018-04-26 NOTE — PROGRESS NOTES
Nursing Notes    Patient presents to the office today in follow-up. Patient rates her pain at 7/10 on the numerical pain scale. Reviewed medications with counts as follows:    Rx Date filled Qty Dispensed Pill Count Last Dose Short   Oxycodone 30 mg 04/02/18 120 31 This am  no           Comments: Patient is here today for a follow up appt today she states her pain level is a 7  She states she has carpal tunnel pain . Patient is [place]  Patient has increased bp today due to the drive over. I will reassess it       POC UDS was performed in office today per verbal order per [facility]    Any new labs or imaging since last appointment? NO    Have you been to an emergency room (ER) or urgent care clinic since your last visit? NO            Have you been hospitalized since your last visit? NO     If yes, where, when, and reason for visit? Have you seen or consulted any other health care providers outside of the [address] [place] [address]  since your last visit? NO     If yes, where, when, and reason for visit? Ms. Tiffany Olguin has a reminder for a \"due or due soon\" health maintenance. I have asked that she contact her primary care provider for follow-up on this health maintenance.

## 2018-04-26 NOTE — PROGRESS NOTES
HISTORY OF PRESENT ILLNESS  Caitlyn Viveros is a 78 y.o. female    HPI: Ms. Tiffany Olguin  returns today for f/u of chronic, severe pain which is widespread and multifocal and includes peripheral neuropathic pain and polyarthralgias. She also suffers with chronic neck pain due to cervical DDD and postlaminectomy syndrome. Today is my first visit with Ms. Patel. She continues with pain unchanged since last visit. We had a very lengthy conversation today about the departure of her previous providers who are no longer with our practice. I explained her that moving forward we will be focusing on more conservative for non-opioid plan of care. This will result in changes to her current treatment plan. She is very reluctant at this time but I have asked her to keep an open mind. I have recommended that we adjust her oxycodone 30 mg down to 20 mg. We also had a very lengthy conversation about her current schedule. I have asked her to use this medication as half to 1 tablet on an as-needed basis only. She does admit that she has been using this medication on a set schedule for quite some time. She reports that she receives Ambien from her psychiatrist for sleep. I have asked her to discuss alternative treatments with her psychiatrist as soon as possible as we will no longer be able to prescribe opioid medications while she is concurrently using benzodiazepine. She verbally agrees. We will discuss further options moving forward. She reports no new complaints today. I will have her follow-up in 3 months for further evaluation and recommendation per    Current medication management includes oxycodone 30 mg 4 times daily PRN. She receives Ambien and Cymbalta from her psychiatrist.   Medications are helping with pain control and quality of life. Her pain is 3-4/10 with medication and 10/10 without. Pt describes pain as tender and aching. Aggravating factors include cold/wet weather, standing too long.  Relieved with rest, medication, and avoiding painful activities. Current treatment is helping to improve general activity, mood, sleep, and enjoyment of life    Ms. Lucila Lott is tolerating medications well, with no side effects noted. She is able to stay more active with less discomfort with these current doses. The patient reports an average of 60-70% pain relief with current treatment/medications. She is informed of side effects, risks, and benefits of this regimen, and emphasizes that she derives a significant improvement in functionality and quality of life, and notes that non-opioid medications and therapies in the past have not offered significant benefit. Pt does report constipation but is well controlled with Miralax. She  is otherwise doing well with no other complaints today. She denies any adverse events including nausea, vomiting, dizziness, increased constipation, hallucinations, or seizures. Because the patient's current regimen places him/her at increased risk for possible overdose, a prescription for naloxone nasal spray has been provided. The patient understands that this medication is only to be used in the setting of a possible overdose and that inadvertent use of this medication could precipitate overt withdrawal.    POC UDS today. Confirmation pending. Allergies   Allergen Reactions    Incivek [Telaprevir] Anaphylaxis    Demerol [Meperidine] Itching and Swelling       Past Surgical History:   Procedure Laterality Date    HX HYSTERECTOMY      HX ORTHOPAEDIC  2004    cervical fusion    HX ORTHOPAEDIC      bilateral carpal tunnel    HX OTHER SURGICAL      liver biopsy    HX TONSILLECTOMY      VASCULAR SURGERY PROCEDURE UNLIST  2013    varicose veins ablation         Review of Systems   Constitutional: Negative for chills, fever and weight loss. HENT: Negative for congestion and sore throat. Eyes: Negative for blurred vision and double vision.    Respiratory: Negative for cough, shortness of breath and wheezing. Cardiovascular: Negative for chest pain and palpitations. Gastrointestinal: Negative for abdominal pain, constipation, diarrhea, heartburn, nausea and vomiting. Genitourinary: Negative. Musculoskeletal: Positive for back pain, joint pain and neck pain. Neurological: Negative for dizziness, seizures and loss of consciousness. Endo/Heme/Allergies: Does not bruise/bleed easily. Psychiatric/Behavioral: Negative for depression. The patient has insomnia. The patient is not nervous/anxious. Physical Exam   Constitutional: She is oriented to person, place, and time and well-developed, well-nourished, and in no distress. No distress. HENT:   Head: Normocephalic and atraumatic. Eyes: EOM are normal.   Pulmonary/Chest: Effort normal.   Musculoskeletal:        Lumbar back: She exhibits tenderness and pain. Neurological: She is alert and oriented to person, place, and time. Gait abnormal.   Skin: Skin is warm and dry. No rash noted. She is not diaphoretic. No erythema. Psychiatric: Mood, memory, affect and judgment normal.   Nursing note and vitals reviewed. ASSESSMENT:    1. Multiple joint pain    2. Encounter for long-term (current) use of high-risk medication    3. Pain in both feet    4. Idiopathic peripheral neuropathy    5. Chronic pain syndrome    6. Neuralgia and neuritis    7. DDD (degenerative disc disease), cervical    8. Bilateral leg and foot pain      COMM: [facility] was reviewed which does not demonstrate aberrancies and/or inconsistencies with regard to the historical prescribing of controlled medications to this patient by other providers. PLAN / Pt Instructions:  1. Modify current plan with no evidence of addiction or diversion. Stable on current medication without adverse events. 2. Refill and adjust oxycodone 30 mg down to 20 mg.   Please take half to 1 tablet up to 4 times daily on an as-needed basis only. 3. Please remember to call at least 34 business days prior to medication refill. 4. Please discuss alternative treatments for sleep with your psychiatrist as soon as possible as we would no longer be able to write opioid medications while you are concurrently using benzodiazepines. This includes Ambien. 5. Naloxone 4 mg nasal spray for opioid induced respiratory depression emergency only. 6. Discussed risks of addiction, dependency, and opioid induced hyperalgesia. 7. Return to clinic in 3 months. Please call and cancel this appointment as well as your pain management agreement if you do decide to transition her care. Medications Ordered Today   Medications    oxyCODONE IR (ROXICODONE) 20 mg immediate release tablet     Sig: Take 1 Tab by mouth four (4) times daily as needed for Pain for up to 30 days. Max Daily Amount: 80 mg. Indications: Pain     Dispense:  120 Tab     Refill:  0         DISPOSITION     Pain medications are prescribed with the objective of pain relief and improved physical and psychosocial function in this patient.  Pain Meds and Quality Of Life have been reviewed. Nonpharmacologic therapy and non-opioid pharmacologic therapy have been considered. Opioid therapy is only prescribed if the expected benefits are anticipated to outweigh risks.  Counseled patient on proper use of prescribed medications.  Reviewed with patient self-help tools, home exercise, and lifestyle changes to assist the patient in self-management of symptoms.  Reviewed with patient the treatment plan, goals of treatment plan, and limitations of treatment plan, to include the potential for side effects from medications and procedures. If side effects occur, it is the responsibility of the patient to inform the clinic so that a change in the treatment plan can be made in a safe manner.  The patient is advised that stopping prescribed medication may cause an increase in symptoms and possible medication withdrawal symptoms. The patient is informed an emergency room evaluation may be necessary if this occurs. Spent 40 minutes with patient today which more than 50% of that time was spent on counseling and coordination of care. Earlene Vallejo 4/26/2018        Note: Please excuse any typographical errors. Voice recognition software was used for this note and may cause mistakes.

## 2018-05-23 ENCOUNTER — TELEPHONE (OUTPATIENT)
Dept: PAIN MANAGEMENT | Age: 68
End: 2018-05-23

## 2018-05-23 DIAGNOSIS — M79.672 PAIN IN BOTH FEET: ICD-10-CM

## 2018-05-23 DIAGNOSIS — M79.671 PAIN IN BOTH FEET: ICD-10-CM

## 2018-05-23 RX ORDER — OXYCODONE HYDROCHLORIDE 20 MG/1
20 TABLET ORAL
Qty: 120 TAB | Refills: 0 | Status: SHIPPED | OUTPATIENT
Start: 2018-05-25 | End: 2018-06-26 | Stop reason: SDUPTHER

## 2018-05-23 NOTE — TELEPHONE ENCOUNTER
Received call from patient requesting medication refill; patient last filled medications on 05/03/18, however , pharmacy would only fill patient's prescription for 100 out of 120 prescribed due to insurance issues; patient states that she will be out of medications on 05/25/18 due to only receiving 100 out of 120 tablets; please advise.

## 2018-05-31 ENCOUNTER — TELEPHONE (OUTPATIENT)
Dept: PAIN MANAGEMENT | Age: 68
End: 2018-05-31

## 2018-06-26 DIAGNOSIS — M79.672 PAIN IN BOTH FEET: ICD-10-CM

## 2018-06-26 DIAGNOSIS — M79.671 PAIN IN BOTH FEET: ICD-10-CM

## 2018-06-26 NOTE — TELEPHONE ENCOUNTER
Ezraиван Hoskins has called requesting a refill of their controlled medication, Oxycodone IR 20 mg, for the management of Multiple joint pain. Last office visit date: 4/26/18    Date last  was pulled and reviewed : 6/26/18 and compliant. Last filled 5/29/18    Was the patient compliant when the above report was pulled? yes    Analgesia: Patient report 50% of pain relief with current medication regimen    Aberrancies: No aberrancies in the last 30 days. ADL's: Patient report she is able to do basic ADL's at home. Adverse Reaction:Patient report no adverse reaction. Provider's last note and plan of care reviewed? yes  Request forwarded to provider for review.

## 2018-06-27 ENCOUNTER — TELEPHONE (OUTPATIENT)
Dept: PAIN MANAGEMENT | Age: 68
End: 2018-06-27

## 2018-06-27 RX ORDER — OXYCODONE HYDROCHLORIDE 20 MG/1
20 TABLET ORAL
Qty: 120 TAB | Refills: 0 | Status: SHIPPED | OUTPATIENT
Start: 2018-06-27 | End: 2018-07-24 | Stop reason: SDUPTHER

## 2018-07-24 ENCOUNTER — OFFICE VISIT (OUTPATIENT)
Dept: PAIN MANAGEMENT | Age: 68
End: 2018-07-24

## 2018-07-24 VITALS
RESPIRATION RATE: 16 BRPM | DIASTOLIC BLOOD PRESSURE: 86 MMHG | WEIGHT: 165 LBS | HEIGHT: 64 IN | BODY MASS INDEX: 28.17 KG/M2 | HEART RATE: 78 BPM | TEMPERATURE: 97.7 F | SYSTOLIC BLOOD PRESSURE: 141 MMHG

## 2018-07-24 DIAGNOSIS — G89.4 CHRONIC PAIN SYNDROME: ICD-10-CM

## 2018-07-24 DIAGNOSIS — G60.9 IDIOPATHIC PERIPHERAL NEUROPATHY: ICD-10-CM

## 2018-07-24 DIAGNOSIS — M79.671 PAIN IN BOTH FEET: ICD-10-CM

## 2018-07-24 DIAGNOSIS — M79.672 BILATERAL LEG AND FOOT PAIN: ICD-10-CM

## 2018-07-24 DIAGNOSIS — M50.30 DDD (DEGENERATIVE DISC DISEASE), CERVICAL: ICD-10-CM

## 2018-07-24 DIAGNOSIS — Z79.899 ENCOUNTER FOR LONG-TERM (CURRENT) USE OF HIGH-RISK MEDICATION: Primary | ICD-10-CM

## 2018-07-24 DIAGNOSIS — M79.672 PAIN IN BOTH FEET: ICD-10-CM

## 2018-07-24 DIAGNOSIS — M79.2 NEURALGIA AND NEURITIS: ICD-10-CM

## 2018-07-24 DIAGNOSIS — M79.605 BILATERAL LEG AND FOOT PAIN: ICD-10-CM

## 2018-07-24 DIAGNOSIS — M79.671 BILATERAL LEG AND FOOT PAIN: ICD-10-CM

## 2018-07-24 DIAGNOSIS — M79.604 BILATERAL LEG AND FOOT PAIN: ICD-10-CM

## 2018-07-24 RX ORDER — OXYCODONE HYDROCHLORIDE 15 MG/1
15 TABLET ORAL
Qty: 120 TAB | Refills: 0 | Status: SHIPPED | OUTPATIENT
Start: 2018-07-29 | End: 2018-08-30 | Stop reason: ALTCHOICE

## 2018-07-24 RX ORDER — OXYCODONE HYDROCHLORIDE 10 MG/1
10 TABLET ORAL
Qty: 120 TAB | Refills: 0 | Status: SHIPPED | OUTPATIENT
Start: 2018-08-28 | End: 2018-08-30 | Stop reason: ALTCHOICE

## 2018-07-24 RX ORDER — OXYCODONE HYDROCHLORIDE 10 MG/1
10 TABLET ORAL
Qty: 110 TAB | Refills: 0 | Status: SHIPPED | OUTPATIENT
Start: 2018-09-27 | End: 2018-08-30 | Stop reason: SDUPTHER

## 2018-07-24 NOTE — PATIENT INSTRUCTIONS
1. Modify current plan with no evidence of addiction or diversion. Stable on current medication without adverse events. 2. Refill and adjust oxycodone 20 mg down to 15 mg up to 4 times daily as needed ×1 month, then adjust down to 10 mg up to 4 times daily as needed ×1 month, then adjust down to 10 mg up to 4 times daily no more than 110 per month until next visit. 3. Naloxone 4 mg nasal spray for opioid induced respiratory depression emergency only. 4. Discussed risks of addiction, dependency, and opioid induced hyperalgesia. 5. Return to clinic in 3 months. Please call and cancel this appointment as well as your pain management agreement if you do decide to transition her care. Preventing Falls: Care Instructions Your Care Instructions Getting around your home safely can be a challenge if you have injuries or health problems that make it easy for you to fall. Loose rugs and furniture in walkways are among the dangers for many older people who have problems walking or who have poor eyesight. People who have conditions such as arthritis, osteoporosis, or dementia also have to be careful not to fall. You can make your home safer with a few simple measures. Follow-up care is a key part of your treatment and safety. Be sure to make and go to all appointments, and call your doctor if you are having problems. It's also a good idea to know your test results and keep a list of the medicines you take. How can you care for yourself at home? Taking care of yourself · You may get dizzy if you do not drink enough water. To prevent dehydration, drink plenty of fluids, enough so that your urine is light yellow or clear like water. Choose water and other caffeine-free clear liquids. If you have kidney, heart, or liver disease and have to limit fluids, talk with your doctor before you increase the amount of fluids you drink. · Exercise regularly to improve your strength, muscle tone, and balance.  Walk if you can. Swimming may be a good choice if you cannot walk easily. · Have your vision and hearing checked each year or any time you notice a change. If you have trouble seeing and hearing, you might not be able to avoid objects and could lose your balance. · Know the side effects of the medicines you take. Ask your doctor or pharmacist whether the medicines you take can affect your balance. Sleeping pills or sedatives can affect your balance. · Limit the amount of alcohol you drink. Alcohol can impair your balance and other senses. · Ask your doctor whether calluses or corns on your feet need to be removed. If you wear loose-fitting shoes because of calluses or corns, you can lose your balance and fall. · Talk to your doctor if you have numbness in your feet. Preventing falls at home · Remove raised doorway thresholds, throw rugs, and clutter. Repair loose carpet or raised areas in the floor. · Move furniture and electrical cords to keep them out of walking paths. · Use nonskid floor wax, and wipe up spills right away, especially on ceramic tile floors. · If you use a walker or cane, put rubber tips on it. If you use crutches, clean the bottoms of them regularly with an abrasive pad, such as steel wool. · Keep your house well lit, especially [place], and outside walkways. Use night-lights in areas such as hallways and bathrooms. Add extra light switches or use remote switches (such as switches that go on or off when you clap your hands) to make it easier to turn lights on if you have to get up during the night. · Install sturdy handrails on stairways. · Move items in your cabinets so that the things you use a lot are on the lower shelves (about waist level). · Keep a cordless phone and a flashlight with new batteries by your bed. If possible, put a phone in each of the main rooms of your house, or carry a cell phone in case you fall and cannot reach a phone.  Or, you can wear a device around your neck or wrist. You push a button that sends a signal for help. · Wear low-heeled shoes that fit well and give your feet good support. Use footwear with nonskid soles. Check the heels and soles of your shoes for wear. Repair or replace worn heels or soles. · Do not wear socks without shoes on wood floors. · Walk on the grass when the sidewalks are slippery. If you live in an area that gets snow and ice in the winter, sprinkle salt on slippery steps and sidewalks. Preventing falls in the bath · Install grab bars and nonskid mats inside and outside your shower or tub and near the toilet and sinks. · Use shower chairs and bath benches. · Use a hand-held shower head that will allow you to sit while showering. · Get into a tub or shower by putting the weaker leg in first. Get out of a tub or shower with your strong side first. 
· Repair loose toilet seats and consider installing a raised toilet seat to make getting on and off the toilet easier. · Keep your bathroom door unlocked while you are in the shower. Where can you learn more? Go to http://valerie-rony.info/. Enter [id number] in the search box to learn more about \"Preventing Falls: Care Instructions. \" Current as of: May 12, 2017 Content Version: 11.7 © 1697-7982 [employer]wise, [facility]. Care instructions adapted under license by [employer] (which disclaims liability or warranty for this information). If you have questions about a medical condition or this instruction, always ask your healthcare professional. [place] [address] any warranty or liability for your use of this information.

## 2018-07-24 NOTE — PROGRESS NOTES
Nursing Notes    Patient presents to the office today in follow-up. Patient rates her pain at 8/10 on the numerical pain scale. Reviewed medications with counts as follows:    Rx Date filled Qty Dispensed Pill Count Last Dose Short    Oxycodone 20 mg 06/30/18 120 27 This am  no         Comments: patient is here today for a follow up appt today she state her pain level today is an 8  PHQ 9 ws done patient denies any depression. She states she was in the ER at [address] due to a glass pain falling on her left foot  She states it cut her artery and there tendons also. She states she stayed in the hospital for 2 days. She states she will need to see Ortho for the foot     POC UDS was performed in office today per verbal order per [facility]    Any new labs or imaging since last appointment? YES    Have you been to an emergency room (ER) or urgent care clinic since your last visit? [facility]     Have you been hospitalized since your last visit? YES   2 days   If yes, where, when, and reason for visit? Have you seen or consulted any other health care providers outside of the [facility]  since your last visit? YES   [place]  If yes, where, when, and reason for visit? Ms. Clarissa Hussein has a reminder for a \"due or due soon\" health maintenance. I have asked that she contact her primary care provider for follow-up on this health maintenance.

## 2018-07-24 NOTE — PROGRESS NOTES
HISTORY OF PRESENT ILLNESS  Vicki Suárez is a 78 y.o. female    HPI: Ms. Jhon Ramirez  returns today for f/u of chronic, severe pain which is widespread and multifocal and includes peripheral neuropathic pain and polyarthralgias. She also suffers with chronic neck pain due to cervical DDD and postlaminectomy syndrome. H/o C4-C6 ACDF 3/22/2017 by Dr. Juan Sewell    Ms. Jhon Ramirez underwent surgery to repair a deep laceration to left ventral service of the left foot on 5/27/2018 by Dr. Kam Fisher. She reports that she was recently provided a prescription for oxycodone 5 mg however her pharmacy would not fill the medication as she was receiving opiate medications from our office. She reports that this medication was never felt. I did explain to her today that we do not treat for acute or postsurgical pain. For future reference. She did disclose the medication to our office but was never filled. She has been doing well with her current treatment plan which continues off her significant pain control. Does report that she has discontinued Ambien. Her psychiatrist is now prescribing trazodone for sleep. We did have a lengthy conversation last visit as well as today about changes to our practice. Explained to her today that we have to continue tapering her medications. Please see tapering plan below for oxycodone. We discussed some options in the office today and will discuss further options moving forward. She has expressed her concern with these changes and states that she will most likely be transferring her care but has not made a formal decision at this time. I have asked her to call and cancel her appointment and pain management agreement if she does decide to transfer her care. Current medication management includes oxycodone 20 mg 4 times daily PRN. She receives Cymbalta from her psychiatrist.  No concurrent benzodiazepines. Ambien recently discontinued.   Medications are helping with pain control and quality of life. Her pain is 3-4/10 with medication and 10/10 without. Pt describes pain as tender and aching. Aggravating factors include cold/wet weather, standing too long. Relieved with rest, medication, and avoiding painful activities. Current treatment is helping to improve general activity, mood, sleep, and enjoyment of life    Ms. Chun Mireles is tolerating medications well, with no side effects noted. She is able to stay more active with less discomfort with these current doses. The patient reports an average of 60-70% pain relief with current treatment/medications. She is informed of side effects, risks, and benefits of this regimen, and emphasizes that she derives a significant improvement in functionality and quality of life, and notes that non-opioid medications and therapies in the past have not offered significant benefit. Pt does report constipation but is well controlled with Miralax. She  is otherwise doing well with no other complaints today. She denies any adverse events including nausea, vomiting, dizziness, increased constipation, hallucinations, or seizures. Because the patient's current regimen places him/her at increased risk for possible overdose, a prescription for naloxone nasal spray has been provided. The patient understands that this medication is only to be used in the setting of a possible overdose and that inadvertent use of this medication could precipitate overt withdrawal.    MME: 120  COMM: 3  OSWESTRY: 50 %  POC UDS today. Confirmation pending.        Allergies   Allergen Reactions    Incivek [Telaprevir] Anaphylaxis    Demerol [Meperidine] Itching and Swelling       Past Surgical History:   Procedure Laterality Date    HX HYSTERECTOMY      HX ORTHOPAEDIC  2004    cervical fusion    HX ORTHOPAEDIC      bilateral carpal tunnel    HX OTHER SURGICAL      liver biopsy    HX TONSILLECTOMY      VASCULAR SURGERY PROCEDURE UNLIST  2013    varicose veins ablation         Review of Systems   Constitutional: Negative for chills, fever and weight loss. HENT: Negative for congestion and sore throat. Eyes: Negative for blurred vision and double vision. Respiratory: Negative for cough, shortness of breath and wheezing. Cardiovascular: Negative for chest pain and palpitations. Gastrointestinal: Negative for abdominal pain, constipation, diarrhea, heartburn, nausea and vomiting. Genitourinary: Negative. Musculoskeletal: Positive for back pain, joint pain and neck pain. Neurological: Negative for dizziness, seizures and loss of consciousness. Endo/Heme/Allergies: Does not bruise/bleed easily. Psychiatric/Behavioral: Negative for depression. The patient has insomnia. The patient is not nervous/anxious. Physical Exam   Constitutional: She is oriented to person, place, and time and well-developed, well-nourished, and in no distress. No distress. HENT:   Head: Normocephalic and atraumatic. Eyes: EOM are normal.   Pulmonary/Chest: Effort normal.   Musculoskeletal:        Lumbar back: She exhibits tenderness and pain. Currently in left walking boot   Neurological: She is alert and oriented to person, place, and time. Gait abnormal.   Skin: Skin is warm and dry. No rash noted. She is not diaphoretic. No erythema. Psychiatric: Mood, memory, affect and judgment normal.   Nursing note and vitals reviewed. ASSESSMENT:    1. Pain in both feet    2. Idiopathic peripheral neuropathy    3. Chronic pain syndrome    4. Neuralgia and neuritis    5. DDD (degenerative disc disease), cervical    6. Bilateral leg and foot pain           [place] Prescription Monitoring Program was reviewed which does not demonstrate aberrancies and/or inconsistencies with regard to the historical prescribing of controlled medications to this patient by other providers. PLAN / Pt Instructions:  1. Modify current plan with no evidence of addiction or diversion.  Stable on current medication without adverse events. 2. Refill and adjust oxycodone 20 mg down to 15 mg up to 4 times daily as needed ×1 month, then adjust down to 10 mg up to 4 times daily as needed ×1 month, then adjust down to 10 mg up to 4 times daily no more than 110 per month until next visit. 3. Naloxone 4 mg nasal spray for opioid induced respiratory depression emergency only. 4. Discussed risks of addiction, dependency, and opioid induced hyperalgesia. 5. Return to clinic in 3 months. Please call and cancel this appointment as well as your pain management agreement if you do decide to transition her care. Medications Ordered Today   Medications    oxyCODONE IR (OXY-IR) 15 mg immediate release tablet     Sig: Take 1 Tab by mouth four (4) times daily as needed for Pain for up to 30 days. Max Daily Amount: 60 mg. Indications: Pain     Dispense:  120 Tab     Refill:  0    oxyCODONE IR (ROXICODONE) 10 mg tab immediate release tablet     Sig: Take 1 Tab by mouth four (4) times daily as needed for Pain for up to 30 days. Max Daily Amount: 40 mg. Indications: Pain     Dispense:  120 Tab     Refill:  0    oxyCODONE IR (ROXICODONE) 10 mg tab immediate release tablet     Sig: Take 1 Tab by mouth four (4) times daily as needed for Pain for up to 30 days. Max Daily Amount: 40 mg. Indications: Pain     Dispense:  110 Tab     Refill:  0         DISPOSITION     Pain medications are prescribed with the objective of pain relief and improved physical and psychosocial function in this patient.  Pain Meds and Quality Of Life have been reviewed. Nonpharmacologic therapy and non-opioid pharmacologic therapy have been considered. Opioid therapy is only prescribed if the expected benefits are anticipated to outweigh risks.  Counseled patient on proper use of prescribed medications.  Reviewed with patient self-help tools, home exercise, and lifestyle changes to assist the patient in self-management of symptoms.    Reviewed with patient the treatment plan, goals of treatment plan, and limitations of treatment plan, to include the potential for side effects from medications and procedures. If side effects occur, it is the responsibility of the patient to inform the clinic so that a change in the treatment plan can be made in a safe manner. The patient is advised that stopping prescribed medication may cause an increase in symptoms and possible medication withdrawal symptoms. The patient is informed an emergency room evaluation may be necessary if this occurs. Spent 25 minutes with patient today which more than 50% of that time was spent on counseling and coordination of care. Nathan Matias, [id number] 7/24/2018        Note: Please excuse any typographical errors. Voice recognition software was used for this note and may cause mistakes.

## 2018-08-30 ENCOUNTER — OFFICE VISIT (OUTPATIENT)
Dept: PAIN MANAGEMENT | Age: 68
End: 2018-08-30

## 2018-08-30 VITALS
DIASTOLIC BLOOD PRESSURE: 75 MMHG | HEART RATE: 61 BPM | HEIGHT: 64 IN | WEIGHT: 162 LBS | RESPIRATION RATE: 14 BRPM | SYSTOLIC BLOOD PRESSURE: 140 MMHG | BODY MASS INDEX: 27.66 KG/M2 | TEMPERATURE: 95.8 F

## 2018-08-30 DIAGNOSIS — F11.20 UNCOMPLICATED OPIOID DEPENDENCE (HCC): Primary | ICD-10-CM

## 2018-08-30 DIAGNOSIS — R63.0 APPETITE LOSS: ICD-10-CM

## 2018-08-30 DIAGNOSIS — G47.00 INSOMNIA, UNSPECIFIED TYPE: ICD-10-CM

## 2018-08-30 RX ORDER — MIRTAZAPINE 30 MG/1
30 TABLET, ORALLY DISINTEGRATING ORAL
Qty: 15 TAB | Refills: 0 | Status: SHIPPED | OUTPATIENT
Start: 2018-08-30 | End: 2018-09-24 | Stop reason: SDUPTHER

## 2018-08-30 RX ORDER — BUPRENORPHINE AND NALOXONE 8; 2 MG/1; MG/1
FILM, SOLUBLE BUCCAL; SUBLINGUAL
Qty: 8 FILM | Refills: 0 | Status: SHIPPED | OUTPATIENT
Start: 2018-08-30 | End: 2018-09-24 | Stop reason: ALTCHOICE

## 2018-08-30 NOTE — PROGRESS NOTES
Nursing Notes Patient presents to the office today in follow-up. Patient rates her pain at 7/10 on the numerical pain scale. Reviewed medications with counts as follows:   
Rx Date filled Qty Dispensed Pill Count Last Dose Short Oxycodone 15 mg 8/3/18 120 15 today no PHQ over the last two weeks 12/9/2016 PHQ Not Done Active Diagnosis of Depression or Bipolar Disorder Comments: POC UDS was performed in office today Any new labs or imaging since last appointment? YES, lab Have you been to an emergency room (ER) or urgent care clinic since your last visit? YES [place]  Left foot Have you been hospitalized since your last visit? NO If yes, where, when, and reason for visit? Have you seen or consulted any other health care providers outside of the [address] [place] [address]  since your last visit? YES If yes, where, when, and reason for visit? Ms. Miguel Jeffries has a reminder for a \"due or due soon\" health maintenance. I have asked that she contact her primary care provider for follow-up on this health maintenance.

## 2018-08-30 NOTE — PROGRESS NOTES
Today's Date:  2018 Patient:  Chandler Salinas Patient :  1950 Chief Complaint Patient presents with  Leg Pain  
  bilateral  
 Foot Pain  
  bilateral  
 Medication Management Chandler Salinas  Is a 78 y.o.  female  who presents for initial visit with me. She is being evaluated for Buprenorphine to prevent withdrawal. Pt here due to UDS positive for Cocaine, and Pt being informed that she can no longer be treated with opioids here. She vehemently denies any cocaine use, but understands clinic policy. Pt is here alone. Past Opioid History: 
  Age of onset: 62, for her peripheral neuropathy. She states this was done secondary to supposed failure on Lyrica and Gabapentin. Substance(s) initially used: Oxycodone Substance(s) presently used: Oxycodone IR Average daily intake:  10 mg qid Time of most recent use: approximately 4 hours ago. Longest period without use: 12 hrs. H/O any experienced withdrawal symptoms: yes, mild symptoms H/O overdose: no 
Prior Buprenorphine use: no 
Marijuana use: no 
Cocaine use: Pt denies previous Cocaine use, Pt adamant that lab result is incorrect. Amphetamine use: no 
Alcohol use: rare Benzodiazepine use: no 
 
Past Medical History: 
Past Medical History:  
Diagnosis Date  Arthritis   
 osteoporosis  Fibromyalgia  Headache   
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   
 hep c resolved  Hypothyroid   
 hypo  Peripheral neuropathy  Psychiatric disorder   
 anxiety depression  Restless leg syndrome Past Surgical History: 
Past Surgical History:  
Procedure Laterality Date  HX HYSTERECTOMY  HX ORTHOPAEDIC  2004  
 cervical fusion  HX ORTHOPAEDIC    
 bilateral carpal tunnel  HX OTHER SURGICAL    
 liver biopsy  HX TONSILLECTOMY  VASCULAR SURGERY PROCEDURE UNLIST   varicose veins ablation Social history:  
Social History Social History  Marital status:  Spouse name: N/A  
 Number of children: N/A  
 Years of education: N/A Social History Main Topics  Smoking status: Never Smoker  Smokeless tobacco: Never Used  Alcohol use No  
 Drug use: No  
 Sexual activity: No  
 
Other Topics Concern  None Social History Narrative Employment Status:  Retired, Disabled Medications: 
 
Current Outpatient Prescriptions:  
  mirtazapine (REMERON SOL-TAB) 30 mg disintegrating tablet, Take 1 Tab by mouth nightly. Indications: insomnia, Disp: 15 Tab, Rfl: 0 
  buprenorphine-naloxone (SUBOXONE) 8-2 mg film sublingaul film, 1/2 film SL twice daily  Indications: Opioid Dependence, Opioid Withdrawal Symptoms, Disp: 8 Film, Rfl: 0 
  hydrOXYzine pamoate (VISTARIL) 25 mg capsule, Take 25 mg by mouth three (3) times daily as needed for Itching., Disp: , Rfl:  
  naloxone 4 mg/actuation spry, 4 mg by Nasal route once as needed (for opioid overdose) for up to 1 dose. Indications: OPIOID TOXICITY, Disp: 1 Package, Rfl: 0 
  alendronate (FOSAMAX) 70 mg tablet, Take  by mouth Every Saturday. , Disp: , Rfl:  
  multivitamin, tx-iron-ca-min (THERA-M W/ IRON) 9 mg iron-400 mcg tab tablet, Take 1 Tab by mouth daily. , Disp: , Rfl:  
  cyanocobalamin (VITAMIN B-12) 500 mcg tablet, Take 500 mcg by mouth two (2) times a day., Disp: , Rfl:  
  ascorbic acid, vitamin C, (VITAMIN C) 500 mg tablet, Take  by mouth two (2) times a day., Disp: , Rfl:  
  cholecalciferol (VITAMIN D3) 1,000 unit tablet, Take  by mouth daily. , Disp: , Rfl:  
  SENNOSIDES/DOCUSATE SODIUM (SENNA PLUS PO), Take  by mouth nightly., Disp: , Rfl:  
  rOPINIRole (REQUIP) 1 mg tablet, Take 0.25 mg by mouth nightly. Indications: Restless Legs Syndrome, 3 hours prior to bedtime, Disp: , Rfl:  
  valACYclovir (VALTREX) 1 g tablet, Take 1 Tab by mouth daily.  (Patient taking differently: Take 1,000 mg by mouth every eight (8) hours as needed. Indications: GENITAL HERPES SIMPLEX), Disp: 30 Tab, Rfl: 2 
  LEVOTHYROXINE SODIUM (SYNTHROID PO), Take 88 mcg by mouth daily. Indications: hypo, Disp: , Rfl:  
  DULOXETINE HCL (CYMBALTA PO), Take 60 mg by mouth daily. , Disp: , Rfl:  
  busPIRone (BUSPAR) 15 mg tablet, Take 15 mg by mouth three (3) times daily. Indications: GENERALIZED ANXIETY DISORDER, Disp: , Rfl:  
 
Allergies: Allergies Allergen Reactions  Incivek [Telaprevir] Anaphylaxis  Demerol [Meperidine] Itching and Swelling Past Family History: No family history on file. REVIEW OF SYSTEMS:  
Constitutional  intentional wt loss, but relates her appetite is poor, no night sweats, unexplained fevers Oral  no mouth pain, tongue or tooth problems, no swallowing problems Cardiac  no CP, PND, orthopnea, palpitations or syncope Resp  no wheezing, chronic coughing, dyspnea GI  no heartburn, nausea, vomiting,constipation, diarrhea,bleeding.   no dysuria, hematuria, urgency, frequency Ortho  no swelling,joint swelling, intermittent joint pain in fingers and knees , no myalgias Derm  no  rashes, lesions. Psych  chronic anxiety/depression symptoms, no hallucinations, suicidal, or violent ideation Neuro  no focal weakness,incoordination, ataxia, involuntary movements Endo - no polyuria, polydipsia, nocturia, hot flashes PHYSICAL EXAM: 
Visit Vitals  /75 (BP 1 Location: Right arm, BP Patient Position: Sitting)  Pulse 61  Temp 95.8 °F (35.4 °C) (Oral)  Resp 14  
 Ht 5' 4\" (1.626 m)  Wt 73.5 kg (162 lb)  BMI 27.81 kg/m2 GENERAL: W/D, W/N, in no acute distress. APPEARANCE: Well groomed. ATTITUDE: Pleasant, Cooperative. SPEECH: Normal Rate, Clear. AFFECT: Appropriate, Sad, Worried. MOOD: Euthymic. THOUGHT PROCESS: Linear, Logical,  Normal Judgement and Insight. THOUGHT CONTENT: Normal 
MEMORY: Grossly Intact. HEENT -- NCAT, Anicteric sclerae. Mus/Skel--  bulk and tone appear normal,. Derm--dorsum of Lt foot bandaged secondary to recent trauma. PHQ over the last two weeks 12/9/2016 PHQ Not Done Active Diagnosis of Depression or Bipolar Disorder POC UDS:  
There are no diagnoses linked to this encounter. Results for orders placed or performed in visit on 07/24/18 AMB POC DRUG SCREEN () Result Value Ref Range ALCOHOL UR POC AMPHETAMINES UR POC Negative CARISOPRODOL UR POC    
 COCAINE UR POC Negative FENTANYL UR POC    
 MDMA/ECSTASY UR POC METHADONE UR POC Negative METHAMPHETAMINE UR POC METHYLPHENIDATE UR POC    
 OPIATES UR POC Presumptive Positive OXYCODONE UR POC Presumptive Positive PHENCYCLIDINE UR POC PROPOXYPHENE UR POC    
 TRAMADOL UR POC    
 TRICYCLICS UR POC BARBITURATES UR POC BENZODIAZEPINES UR POC Negative CANNABINOIDS UR POC Negative BUPRENORPHINE UR POC Negative SUBSTANCE DEPENDENCE DISORDER ASSESSMENT: 
 
1. Tolerance, as defined by either of the following: A. A need for increasing amounts of the substance to achieve desired effect or intoxication. No 
   B. Diminishing effect with continued use of the same amount of the substance. Yes 
 
2. Withdrawal, as manifested by either of the following: A. The characteristic withdrawal syndrome. Yes B. The same (or closely related) substance is taken to prevent or relieve withdrawal symptoms. No 
 
3. The substance is often taken in larger amounts or over a longer period of time than recommended or needed. No 
 
4. There is a persistent desire or unsuccessful attempts to reduce or control substance use. Yes 5. Significant time is spent in activities necessary to obtain the substance, use the substance, or recover from its effects. No 
 
6. Important social, occupational, or recreational activities are sacrificed or reduced due to substance use.  No 
 
 7. The substance use is continued despite awareness of a persistent or recurrent physical or psychological issue likely to have been caused or exacerbated by the substance. No 
 
Having met 3 or more of the above criteria, patient demonstrates Substance/Opioid Dependence. Yes Substance/Opioid Dependence is hereby established. Yes Patient has given informed consent for treatment with Buprenorphine/Naloxone, and other medication deemed appropriate to aid in his/her care. Yes Self-Induction dosing explained in detail to the patient. Yes Treatment and program requirements discussed and explained in detail with the patient. Yes Clinic policies reviewed. Yes Pt is to avoid further use of any controlled or illicit substances which have not been cleared for use without clinic notification. [place]  reviewed, is appropriate, and positive for only prescribed medications. Yes Reviewed with patient the treatment plan, goals of treatment plan, and limitations of treatment plan, to include the potential for side effects from medications and procedures. If side effects occur, it is the responsibility of the patient to inform the clinic so that a change in the treatment plan can be made in a safe manner. The patient is advised that stopping prescribed medication may cause an increase in symptoms and possible medication withdrawal symptoms. The patient is informed an emergency room evaluation may be necessary if this occurs. I spent 45 minutes with the patient in face-to-face consultation, of which greater than 50% was spent in counseling and coordination of care as described above. Diagnoses:  
1. Uncomplicated opioid dependence ([facility] [address] - buprenorphine-naloxone (SUBOXONE) 8-2 mg film sublingaul film; 1/2 film SL twice daily  Indications: Opioid Dependence, Opioid Withdrawal Symptoms  Dispense: 8 Film; Refill: 0 
 
2. Insomnia, unspecified type - mirtazapine (REMERON SOL-TAB) 30 mg disintegrating tablet; Take 1 Tab by mouth nightly. Indications: insomnia  Dispense: 15 Tab; Refill: 0 
 
3. Appetite loss 
- mirtazapine (REMERON SOL-TAB) 30 mg disintegrating tablet; Take 1 Tab by mouth nightly. Indications: insomnia  Dispense: 15 Tab; Refill: 0 Follow-up Disposition: 
Return in about 1 week (around 9/6/2018) for medication re-evaluation. Patient verbalized understanding and is in agreement with treatment plan as outlined above. All questions answered.  
 
 
 
Randy Bradshaw MD

## 2018-08-30 NOTE — MR AVS SNAPSHOT
[address] [facility] [address] 
[phone] Patient: Christopher Graves MRN: [id number] Visit Information Date & Time Provider Department Dept. Phone Encounter #  
 8/30/2018 10:10 AM Ervin Matias MD [address] [facility] [phone] Follow-up Instructions Return in about 1 week (around 9/6/2018) for medication re-evaluation. Upcoming Health Maintenance Date Due DTaP/Tdap/Td series (1 - Tdap) 9/25/1972 BREAST CANCER SCRN MAMMOGRAM 9/25/2001 ZOSTER VACCINE AGE 60> 7/25/2011 GLAUCOMA SCREENING Q2Y 9/25/2016 Bone Densitometry (Dexa) Screening 9/25/2016 Pneumococcal 65+ Low/Medium Risk (1 of 2 - PCV13) 9/25/2016 MEDICARE YEARLY EXAM 3/14/2018 Influenza Age 5 to Adult 8/1/2018 COLONOSCOPY 11/7/2021 Allergies as of 8/30/2018  Review Complete On: 8/30/2018 By: Ervin Matias MD  
  
 Severity Noted Reaction Type Reactions Incivek [Telaprevir] High 02/23/2017    Anaphylaxis Demerol [Meperidine]  08/01/2012    Itching, Swelling Current Immunizations  Never Reviewed No immunizations on file. Not reviewed this visit You Were Diagnosed With   
  
 Codes Comments Uncomplicated opioid dependence ([facility]    -  Primary ICD-10-CM: F11.20 ICD-9-CM: 304.00 Insomnia, unspecified type     ICD-10-CM: G47.00 ICD-9-CM: 780.52 Appetite loss     ICD-10-CM: R63.0 ICD-9-CM: 008. 0 Vitals BP Pulse Temp Resp Height(growth percentile) Weight(growth percentile) 140/75 (BP 1 Location: Right arm, BP Patient Position: Sitting) 61 95.8 °F (35.4 °C) (Oral) 14 5' 4\" (1.626 m) 162 lb (73.5 kg) BMI OB Status Smoking Status 27.81 kg/m2 Hysterectomy Never Smoker Vitals History BMI and BSA Data Body Mass Index Body Surface Area  
 27.81 kg/m 2 1.82 m 2 Preferred Pharmacy Pharmacy Name Phone [id number] [phone] Your Updated Medication List  
  
   
This list is accurate as of 8/30/18 11:59 AM.  Always use your most recent med list.  
  
  
  
  
 buprenorphine-naloxone 8-2 mg Film sublingaul film Commonly known as:  SUBOXONE  
1/2 film SL twice daily  Indications: Opioid Dependence, Opioid Withdrawal Symptoms  
  
 busPIRone 15 mg tablet Commonly known as:  BUSPAR Take 15 mg by mouth three (3) times daily. Indications: GENERALIZED ANXIETY DISORDER  
  
 CYMBALTA PO Take 60 mg by mouth daily. FOSAMAX 70 mg tablet Generic drug:  alendronate Take  by mouth Every Saturday. hydrOXYzine pamoate 25 mg capsule Commonly known as:  VISTARIL Take 25 mg by mouth three (3) times daily as needed for Itching. mirtazapine 30 mg disintegrating tablet Commonly known as:  REMERON SOL-TAB Take 1 Tab by mouth nightly. Indications: insomnia  
  
 multivitamin, tx-iron-ca-min 9 mg iron-400 mcg Tab tablet Commonly known as:  THERA-M w/ IRON Take 1 Tab by mouth daily. naloxone 4 mg/actuation nasal spray Commonly known as:  NARCAN  
4 mg by Nasal route once as needed (for opioid overdose) for up to 1 dose. Indications: OPIOID TOXICITY  
  
 rOPINIRole 1 mg tablet Commonly known as:  Elizabeth Elliott Take 0.25 mg by mouth nightly. Indications: Restless Legs Syndrome, 3 hours prior to bedtime SENNA PLUS PO Take  by mouth nightly. SYNTHROID PO Take 88 mcg by mouth daily. Indications: hypo  
  
 valACYclovir 1 gram tablet Commonly known as:  VALTREX Take 1 Tab by mouth daily. VITAMIN B-12 500 mcg tablet Generic drug:  cyanocobalamin Take 500 mcg by mouth two (2) times a day. VITAMIN C 500 mg tablet Generic drug:  ascorbic acid (vitamin C) Take  by mouth two (2) times a day. VITAMIN D3 1,000 unit tablet Generic drug:  cholecalciferol Take  by mouth daily. Prescriptions Printed Refills  
 buprenorphine-naloxone (SUBOXONE) 8-2 mg film sublingaul film 0 Si/2 film SL twice daily  Indications: Opioid Dependence, Opioid Withdrawal Symptoms Class: Print Prescriptions Sent to Pharmacy Refills  
 mirtazapine (REMERON SOL-TAB) 30 mg disintegrating tablet 0 Sig: Take 1 Tab by mouth nightly. Indications: insomnia Class: Normal  
 Pharmacy: [facility] [place] [address] [address]  #: [phone] Route: Oral  
  
Follow-up Instructions Return in about 1 week (around 2018) for medication re-evaluation. Patient Instructions Preventing Falls: Care Instructions Your Care Instructions Getting around your home safely can be a challenge if you have injuries or health problems that make it easy for you to fall. Loose rugs and furniture in walkways are among the dangers for many older people who have problems walking or who have poor eyesight. People who have conditions such as arthritis, osteoporosis, or dementia also have to be careful not to fall. You can make your home safer with a few simple measures. Follow-up care is a key part of your treatment and safety. Be sure to make and go to all appointments, and call your doctor if you are having problems. It's also a good idea to know your test results and keep a list of the medicines you take. How can you care for yourself at home? Taking care of yourself · You may get dizzy if you do not drink enough water. To prevent dehydration, drink plenty of fluids, enough so that your urine is light yellow or clear like water. Choose water and other caffeine-free clear liquids. If you have kidney, heart, or liver disease and have to limit fluids, talk with your doctor before you increase the amount of fluids you drink. · Exercise regularly to improve your strength, muscle tone, and balance. Walk if you can. Swimming may be a good choice if you cannot walk easily. · Have your vision and hearing checked each year or any time you notice a change. If you have trouble seeing and hearing, you might not be able to avoid objects and could lose your balance. · Know the side effects of the medicines you take. Ask your doctor or pharmacist whether the medicines you take can affect your balance. Sleeping pills or sedatives can affect your balance. · Limit the amount of alcohol you drink. Alcohol can impair your balance and other senses. · Ask your doctor whether calluses or corns on your feet need to be removed. If you wear loose-fitting shoes because of calluses or corns, you can lose your balance and fall. · Talk to your doctor if you have numbness in your feet. Preventing falls at home · Remove raised doorway thresholds, throw rugs, and clutter. Repair loose carpet or raised areas in the floor. · Move furniture and electrical cords to keep them out of walking paths. · Use nonskid floor wax, and wipe up spills right away, especially on ceramic tile floors. · If you use a walker or cane, put rubber tips on it. If you use crutches, clean the bottoms of them regularly with an abrasive pad, such as steel wool. · Keep your house well lit, especially [place], and outside walkways. Use night-lights in areas such as hallways and bathrooms. Add extra light switches or use remote switches (such as switches that go on or off when you clap your hands) to make it easier to turn lights on if you have to get up during the night. · Install sturdy handrails on stairways. · Move items in your cabinets so that the things you use a lot are on the lower shelves (about waist level). · Keep a cordless phone and a flashlight with new batteries by your bed. If possible, put a phone in each of the main rooms of your house, or carry a cell phone in case you fall and cannot reach a phone.  Or, you can wear a device around your neck or wrist. You push a button that sends a signal for help. · Wear low-heeled shoes that fit well and give your feet good support. Use footwear with nonskid soles. Check the heels and soles of your shoes for wear. Repair or replace worn heels or soles. · Do not wear socks without shoes on wood floors. · Walk on the grass when the sidewalks are slippery. If you live in an area that gets snow and ice in the winter, sprinkle salt on slippery steps and sidewalks. Preventing falls in the bath · Install grab bars and nonskid mats inside and outside your shower or tub and near the toilet and sinks. · Use shower chairs and bath benches. · Use a hand-held shower head that will allow you to sit while showering. · Get into a tub or shower by putting the weaker leg in first. Get out of a tub or shower with your strong side first. 
· Repair loose toilet seats and consider installing a raised toilet seat to make getting on and off the toilet easier. · Keep your bathroom door unlocked while you are in the shower. Where can you learn more? Go to http://valerie-rony.info/. Enter [phone] in the search box to learn more about \"Preventing Falls: Care Instructions. \" Current as of: May 12, 2017 Content Version: 11.7 © 4523-0888 [employer]. Care instructions adapted under license by [employer] (which disclaims liability or warranty for this information). If you have questions about a medical condition or this instruction, always ask your healthcare professional. [place] [address] any warranty or liability for your use of this information. Buprenorphine/Naloxone (Into the mouth) Buprenorphine (bue-pre-NOR-feen), Naloxone (nal-OX-one) Treats narcotic dependence. Brand Name(s): Bunavail, Suboxone, Zubsolv There may be other brand names for this medicine. When This Medicine Should Not Be Used: This medicine is not right for everyone. Do not use it if you had an allergic reaction to buprenorphine or naloxone. How to Use This Medicine: Thin Sheet, Tablet · Take your medicine as directed. Your dose may need to be changed several times to find what works best for you. · You must let the medicine dissolve. Never swallow the film or tablet. Your body may not absorb enough of the medicine if you swallow it. · Your health caregiver should show you how to use the medicine. If you do not understand, ask for help. It is important to use the medicine correctly. · Do not talk while the medicine is in your mouth. · Buccal film: Rinse your mouth with water to moisten it. Place the film against the inside of your cheek. If your doctor told you to use more than 1 film, place the second film inside your other cheek. Do not place more than 2 films inside of 1 cheek at a time. Do not move or touch the film. Do not eat or drink anything until the film is completely dissolved. · Sublingual tablet: Place the tablet under your tongue. If your doctor told you to use more than 1 tablet, place all of the tablets in different places under your tongue at the same time. You can use 2 tablets at a time until you have taken all of the medicine, if that is easier for you. Let the tablets dissolve completely in your mouth. Do not eat or drink anything until the tablets are completely dissolved. · Sublingual film: Drink some water to help moisten your mouth. Place the film under your tongue. If your doctor told you to use more than 1 film, place the second film on the opposite side from the first one. Do not move the film after you place it under your tongue. If you are supposed to use more than 2 films, use them the same way, but do not start until the first 2 films are completely dissolved. · Do not break, crush, chew, or cut the film or tablet. · This medicine should come with a Medication Guide.  Ask your pharmacist for a copy if you do not have one. · Missed dose: Take a dose as soon as you remember. If it is almost time for your next dose, wait until then and take a regular dose. Do not take extra medicine to make up for a missed dose. · Store the medicine in a closed container at room temperature, away from heat, moisture, and direct light. Ask your pharmacist about the best way to dispose of medicine you do not use. Drugs and Foods to Avoid: Ask your doctor or pharmacist before using any other medicine, including over-the-counter medicines, vitamins, and herbal products. · Do not use this medicine if you are using or have used an MAO inhibitor within the past 14 days. · Some medicines can affect how buprenorphine/naloxone works. Tell your doctor if you are using the following: ¨ Carbamazepine, erythromycin, ketoconazole, mirtazapine, phenobarbital, phenytoin, rifampin, tramadol, or trazodone ¨ Medicine to treat depression ¨ Medicine to treat HIV/AIDS (including atazanavir, delavirdine, efavirenz, etravirine, nevirapine, ritonavir) ¨ Phenothiazine medicine · Do not drink alcohol while you are using this medicine. · Tell your doctor if you use anything else that makes you sleepy. Some examples are allergy medicine, narcotic pain medicine, and alcohol. Tell your doctor if you are also using butorphanol, nalbuphine, pentazocine, or a muscle relaxer. Warnings While Using This Medicine: · Tell your doctor if you are pregnant or breastfeeding, or if you have kidney disease, liver disease (including hepatitis), lung or breathing problems, adrenal gland problems, an enlarged prostate, trouble urinating, gallbladder problems, low thyroid levels, stomach problems, or a history of depression, brain tumor, head injury, alcohol or drug abuse. · This medicine may cause the following problems: 
¨ High risk of overdose, which can lead to death ¨ Respiratory depression (serious breathing problem that can be life-threatening) ¨ Liver problems ¨ Serotonin syndrome, when used with certain medicines · This medicine may make you dizzy or drowsy. Do not drive or do anything that could be dangerous until you know how this medicine affects you. Stand or sit up slowly if you feel lightheaded or dizzy. · Tell any doctor or dentist who treats you that you are using this medicine. · This medicine can be habit-forming. Do not use more than your prescribed dose. Call your doctor if you think your medicine is not working. · Do not stop using this medicine suddenly. Your doctor will need to slowly decrease your dose before you stop it completely. · This medicine could cause infertility. Talk with your doctor before using this medicine if you plan to have children. · Keep all medicine out of the reach of children. Never share your medicine with anyone. Possible Side Effects While Using This Medicine:  
Call your doctor right away if you notice any of these side effects: · Allergic reaction: Itching or hives, swelling in your face or hands, swelling or tingling in your mouth or throat, chest tightness, trouble breathing · Blue lips, fingernails, or skin · Dark urine or pale stools, nausea, vomiting, loss of appetite, stomach pain, yellow skin or eyes · Extreme dizziness or weakness, shallow breathing, sweating, seizures, cold or clammy skin · Severe confusion, lightheadedness, dizziness, or fainting · Trouble breathing or slow breathing If you notice these less serious side effects, talk with your doctor:  
· Headache, trouble sleeping · Constipation or upset stomach · Shaking, feeling hot or cold, runny nose, watery eyes, diarrhea, vomiting, and muscle aches If you notice other side effects that you think are caused by this medicine, tell your doctor. Call your doctor for medical advice about side effects. You may report side effects to FDA at 8-662-NCY-0243 © 2017 [facility] Information is for End User's use only and may not be sold, redistributed or otherwise used for commercial purposes. The above information is an  only. It is not intended as medical advice for individual conditions or treatments. Talk to your doctor, nurse or pharmacist before following any medical regimen to see if it is safe and effective for you. Introducing [facility] & [facility]! Wilfrido Ba introduces [employer] patient portal. Now you can access parts of your medical record, email your doctor's office, and request medication refills online. 1. In your internet browser, go to [email or web address] 2. Click on the First Time User? Click Here link in the Sign In box. You will see the New Member Sign Up page. 3. Enter your [employer] Access Code exactly as it appears below. You will not need to use this code after youve completed the sign-up process. If you do not sign up before the expiration date, you must request a new code. · [employer] Access Code: [email or web address] Expires: 11/28/2018 11:46 AM 
 
4. Enter the last four digits of your Social Security Number (xxxx) and Date of Birth (mm/dd/yyyy) as indicated and click Submit. You will be taken to the next sign-up page. 5. Create a [employer] ID. This will be your [employer] login ID and cannot be changed, so think of one that is secure and easy to remember. 6. Create a [employer] password. You can change your password at any time. 7. Enter your Password Reset Question and Answer. This can be used at a later time if you forget your password. 8. Enter your e-mail address. You will receive e-mail notification when new information is available in [address] 9. Click Sign Up. You can now view and download portions of your medical record. 10. Click the Download Summary menu link to download a portable copy of your medical information. If you have questions, please visit the Frequently Asked Questions section of the [employer] website. Remember, [employer] is NOT to be used for urgent needs. For medical emergencies, dial 911. Now available from your iPhone and Android! Please provide this summary of care documentation to your next provider. Your primary care clinician is listed as Serina Ruiz. If you have any questions after today's visit, please call [phone].

## 2018-08-30 NOTE — PATIENT INSTRUCTIONS
Preventing Falls: Care Instructions Your Care Instructions Getting around your home safely can be a challenge if you have injuries or health problems that make it easy for you to fall. Loose rugs and furniture in walkways are among the dangers for many older people who have problems walking or who have poor eyesight. People who have conditions such as arthritis, osteoporosis, or dementia also have to be careful not to fall. You can make your home safer with a few simple measures. Follow-up care is a key part of your treatment and safety. Be sure to make and go to all appointments, and call your doctor if you are having problems. It's also a good idea to know your test results and keep a list of the medicines you take. How can you care for yourself at home? Taking care of yourself · You may get dizzy if you do not drink enough water. To prevent dehydration, drink plenty of fluids, enough so that your urine is light yellow or clear like water. Choose water and other caffeine-free clear liquids. If you have kidney, heart, or liver disease and have to limit fluids, talk with your doctor before you increase the amount of fluids you drink. · Exercise regularly to improve your strength, muscle tone, and balance. Walk if you can. Swimming may be a good choice if you cannot walk easily. · Have your vision and hearing checked each year or any time you notice a change. If you have trouble seeing and hearing, you might not be able to avoid objects and could lose your balance. · Know the side effects of the medicines you take. Ask your doctor or pharmacist whether the medicines you take can affect your balance. Sleeping pills or sedatives can affect your balance. · Limit the amount of alcohol you drink. Alcohol can impair your balance and other senses. · Ask your doctor whether calluses or corns on your feet need to be removed.  If you wear loose-fitting shoes because of calluses or corns, you can lose your balance and fall. · Talk to your doctor if you have numbness in your feet. Preventing falls at home · Remove raised doorway thresholds, throw rugs, and clutter. Repair loose carpet or raised areas in the floor. · Move furniture and electrical cords to keep them out of walking paths. · Use nonskid floor wax, and wipe up spills right away, especially on ceramic tile floors. · If you use a walker or cane, put rubber tips on it. If you use crutches, clean the bottoms of them regularly with an abrasive pad, such as steel wool. · Keep your house well lit, especially [place], and outside walkways. Use night-lights in areas such as hallways and bathrooms. Add extra light switches or use remote switches (such as switches that go on or off when you clap your hands) to make it easier to turn lights on if you have to get up during the night. · Install sturdy handrails on stairways. · Move items in your cabinets so that the things you use a lot are on the lower shelves (about waist level). · Keep a cordless phone and a flashlight with new batteries by your bed. If possible, put a phone in each of the main rooms of your house, or carry a cell phone in case you fall and cannot reach a phone. Or, you can wear a device around your neck or wrist. You push a button that sends a signal for help. · Wear low-heeled shoes that fit well and give your feet good support. Use footwear with nonskid soles. Check the heels and soles of your shoes for wear. Repair or replace worn heels or soles. · Do not wear socks without shoes on wood floors. · Walk on the grass when the sidewalks are slippery. If you live in an area that gets snow and ice in the winter, sprinkle salt on slippery steps and sidewalks. Preventing falls in the bath · Install grab bars and nonskid mats inside and outside your shower or tub and near the toilet and sinks. · Use shower chairs and bath benches. · Use a hand-held shower head that will allow you to sit while showering. · Get into a tub or shower by putting the weaker leg in first. Get out of a tub or shower with your strong side first. 
· Repair loose toilet seats and consider installing a raised toilet seat to make getting on and off the toilet easier. · Keep your bathroom door unlocked while you are in the shower. Where can you learn more? Go to http://valerie-rony.info/. Enter [phone] in the search box to learn more about \"Preventing Falls: Care Instructions. \" Current as of: May 12, 2017 Content Version: 11.7 © 0905-0606 [employer]. Care instructions adapted under license by [employer] (which disclaims liability or warranty for this information). If you have questions about a medical condition or this instruction, always ask your healthcare professional. [place] [address] any warranty or liability for your use of this information. Buprenorphine/Naloxone (Into the mouth) Buprenorphine (bue-pre-NOR-feen), Naloxone (nal-OX-one) Treats narcotic dependence. Brand Name(s): Bunavail, Suboxone, Zubsolv There may be other brand names for this medicine. When This Medicine Should Not Be Used: This medicine is not right for everyone. Do not use it if you had an allergic reaction to buprenorphine or naloxone. How to Use This Medicine: Thin Sheet, Tablet · Take your medicine as directed. Your dose may need to be changed several times to find what works best for you. · You must let the medicine dissolve. Never swallow the film or tablet. Your body may not absorb enough of the medicine if you swallow it. · Your health caregiver should show you how to use the medicine. If you do not understand, ask for help. It is important to use the medicine correctly. · Do not talk while the medicine is in your mouth. · Buccal film: Rinse your mouth with water to moisten it.  Place the film against the inside of your cheek. If your doctor told you to use more than 1 film, place the second film inside your other cheek. Do not place more than 2 films inside of 1 cheek at a time. Do not move or touch the film. Do not eat or drink anything until the film is completely dissolved. · Sublingual tablet: Place the tablet under your tongue. If your doctor told you to use more than 1 tablet, place all of the tablets in different places under your tongue at the same time. You can use 2 tablets at a time until you have taken all of the medicine, if that is easier for you. Let the tablets dissolve completely in your mouth. Do not eat or drink anything until the tablets are completely dissolved. · Sublingual film: Drink some water to help moisten your mouth. Place the film under your tongue. If your doctor told you to use more than 1 film, place the second film on the opposite side from the first one. Do not move the film after you place it under your tongue. If you are supposed to use more than 2 films, use them the same way, but do not start until the first 2 films are completely dissolved. · Do not break, crush, chew, or cut the film or tablet. · This medicine should come with a Medication Guide. Ask your pharmacist for a copy if you do not have one. · Missed dose: Take a dose as soon as you remember. If it is almost time for your next dose, wait until then and take a regular dose. Do not take extra medicine to make up for a missed dose. · Store the medicine in a closed container at room temperature, away from heat, moisture, and direct light. Ask your pharmacist about the best way to dispose of medicine you do not use. Drugs and Foods to Avoid: Ask your doctor or pharmacist before using any other medicine, including over-the-counter medicines, vitamins, and herbal products. · Do not use this medicine if you are using or have used an MAO inhibitor within the past 14 days. · Some medicines can affect how buprenorphine/naloxone works. Tell your doctor if you are using the following: ¨ Carbamazepine, erythromycin, ketoconazole, mirtazapine, phenobarbital, phenytoin, rifampin, tramadol, or trazodone ¨ Medicine to treat depression ¨ Medicine to treat HIV/AIDS (including atazanavir, delavirdine, efavirenz, etravirine, nevirapine, ritonavir) ¨ Phenothiazine medicine · Do not drink alcohol while you are using this medicine. · Tell your doctor if you use anything else that makes you sleepy. Some examples are allergy medicine, narcotic pain medicine, and alcohol. Tell your doctor if you are also using butorphanol, nalbuphine, pentazocine, or a muscle relaxer. Warnings While Using This Medicine: · Tell your doctor if you are pregnant or breastfeeding, or if you have kidney disease, liver disease (including hepatitis), lung or breathing problems, adrenal gland problems, an enlarged prostate, trouble urinating, gallbladder problems, low thyroid levels, stomach problems, or a history of depression, brain tumor, head injury, alcohol or drug abuse. · This medicine may cause the following problems: 
¨ High risk of overdose, which can lead to death ¨ Respiratory depression (serious breathing problem that can be life-threatening) ¨ Liver problems ¨ Serotonin syndrome, when used with certain medicines · This medicine may make you dizzy or drowsy. Do not drive or do anything that could be dangerous until you know how this medicine affects you. Stand or sit up slowly if you feel lightheaded or dizzy. · Tell any doctor or dentist who treats you that you are using this medicine. · This medicine can be habit-forming. Do not use more than your prescribed dose. Call your doctor if you think your medicine is not working. · Do not stop using this medicine suddenly. Your doctor will need to slowly decrease your dose before you stop it completely. · This medicine could cause infertility. Talk with your doctor before using this medicine if you plan to have children. · Keep all medicine out of the reach of children. Never share your medicine with anyone. Possible Side Effects While Using This Medicine:  
Call your doctor right away if you notice any of these side effects: · Allergic reaction: Itching or hives, swelling in your face or hands, swelling or tingling in your mouth or throat, chest tightness, trouble breathing · Blue lips, fingernails, or skin · Dark urine or pale stools, nausea, vomiting, loss of appetite, stomach pain, yellow skin or eyes · Extreme dizziness or weakness, shallow breathing, sweating, seizures, cold or clammy skin · Severe confusion, lightheadedness, dizziness, or fainting · Trouble breathing or slow breathing If you notice these less serious side effects, talk with your doctor:  
· Headache, trouble sleeping · Constipation or upset stomach · Shaking, feeling hot or cold, runny nose, watery eyes, diarrhea, vomiting, and muscle aches If you notice other side effects that you think are caused by this medicine, tell your doctor. Call your doctor for medical advice about side effects. You may report side effects to FDA at 1-820-FDA-6704 © 2017 [address] Information is for End User's use only and may not be sold, redistributed or otherwise used for commercial purposes. The above information is an  only. It is not intended as medical advice for individual conditions or treatments. Talk to your doctor, nurse or pharmacist before following any medical regimen to see if it is safe and effective for you.

## 2018-09-10 ENCOUNTER — TELEPHONE (OUTPATIENT)
Dept: PAIN MANAGEMENT | Age: 68
End: 2018-09-10

## 2018-09-10 NOTE — TELEPHONE ENCOUNTER
09/07/2018 AT 02:39 PM The patient left a message stating that Suboxone that prescribed by Dr. Juan J Lance. Is not covered by her insurance.

## 2018-09-24 ENCOUNTER — OFFICE VISIT (OUTPATIENT)
Dept: PAIN MANAGEMENT | Age: 68
End: 2018-09-24

## 2018-09-24 VITALS
SYSTOLIC BLOOD PRESSURE: 130 MMHG | DIASTOLIC BLOOD PRESSURE: 69 MMHG | TEMPERATURE: 97.6 F | BODY MASS INDEX: 27.66 KG/M2 | RESPIRATION RATE: 14 BRPM | HEIGHT: 64 IN | WEIGHT: 162 LBS | HEART RATE: 69 BPM

## 2018-09-24 DIAGNOSIS — F14.90 COCAINE USE: ICD-10-CM

## 2018-09-24 DIAGNOSIS — R63.0 APPETITE LOSS: ICD-10-CM

## 2018-09-24 DIAGNOSIS — F11.20 UNCOMPLICATED OPIOID DEPENDENCE (HCC): Primary | ICD-10-CM

## 2018-09-24 DIAGNOSIS — G47.00 INSOMNIA, UNSPECIFIED TYPE: ICD-10-CM

## 2018-09-24 DIAGNOSIS — Z79.899 ENCOUNTER FOR LONG-TERM (CURRENT) USE OF HIGH-RISK MEDICATION: ICD-10-CM

## 2018-09-24 LAB
ALCOHOL UR POC: NORMAL
AMPHETAMINES UR POC: NEGATIVE
BARBITURATES UR POC: NORMAL
BENZODIAZEPINES UR POC: NEGATIVE
BUPRENORPHINE UR POC: NEGATIVE
CANNABINOIDS UR POC: NEGATIVE
CARISOPRODOL UR POC: NORMAL
COCAINE UR POC: NORMAL
FENTANYL UR POC: NORMAL
MDMA/ECSTASY UR POC: NORMAL
METHADONE UR POC: NEGATIVE
METHAMPHETAMINE UR POC: NORMAL
METHYLPHENIDATE UR POC: NORMAL
OPIATES UR POC: NORMAL
OXYCODONE UR POC: NORMAL
PHENCYCLIDINE UR POC: NORMAL
PROPOXYPHENE UR POC: NORMAL
TRAMADOL UR POC: NORMAL
TRICYCLICS UR POC: NORMAL

## 2018-09-24 RX ORDER — MIRTAZAPINE 30 MG/1
30 TABLET, ORALLY DISINTEGRATING ORAL
Qty: 30 TAB | Refills: 1 | Status: SHIPPED | OUTPATIENT
Start: 2018-09-24 | End: 2018-11-18 | Stop reason: SDUPTHER

## 2018-09-24 NOTE — PROGRESS NOTES
Nursing Notes Patient presents to the office today in follow-up. Patient rates her pain at 6/10 on the numerical pain scale. Reviewed medications with counts as follows:   
Rx Date filled Qty Dispensed Pill Count Last Dose Short Last opioid agreement 12/13/18 Last urine drug screen 9/4/18 and today Comments: Patient unable to fill suboxone  Insurance dose not cover. POC UDS was performed in office today Any new labs or imaging since last appointment? NO Have you been to an emergency room (ER) or urgent care clinic since your last visit? NO Have you been hospitalized since your last visit? NO If yes, where, when, and reason for visit? Have you seen or consulted any other health care providers outside of the [address] [place] [address]  since your last visit? YES If yes, where, when, and reason for visit? Ms. Richards Late has a reminder for a \"due or due soon\" health maintenance. I have asked that she contact her primary care provider for follow-up on this health maintenance. PHQ over the last two weeks 12/9/2016 PHQ Not Done Active Diagnosis of Depression or Bipolar Disorder

## 2018-09-24 NOTE — MR AVS SNAPSHOT
[address] [facility] [address] 
[phone] Patient: Beatriz Luna MRN: [id number] Visit Information Date & Time Provider Department Dept. Phone Encounter #  
 9/24/2018 10:30 AM Alicja Ayala MD [address] [place] [address] for Pain Management [phone] Follow-up Instructions Return in about 1 week (around 10/1/2018) for medication re-evaluation, evaluation for further weaning of medication. Upcoming Health Maintenance Date Due DTaP/Tdap/Td series (1 - Tdap) 9/25/1972 Shingrix Vaccine Age 50> (1 of 2) 9/25/2001 BREAST CANCER SCRN MAMMOGRAM 9/25/2001 GLAUCOMA SCREENING Q2Y 9/25/2016 Bone Densitometry (Dexa) Screening 9/25/2016 Pneumococcal 65+ Low/Medium Risk (1 of 2 - PCV13) 9/25/2016 MEDICARE YEARLY EXAM 3/14/2018 Influenza Age 5 to Adult 8/1/2018 COLONOSCOPY 11/7/2021 Allergies as of 9/24/2018  Review Complete On: 9/24/2018 By: Heath Seip Severity Noted Reaction Type Reactions Incivek [Telaprevir] High 02/23/2017    Anaphylaxis Demerol [Meperidine]  08/01/2012    Itching, Swelling Current Immunizations  Never Reviewed No immunizations on file. Not reviewed this visit You Were Diagnosed With   
  
 Codes Comments Uncomplicated opioid dependence ([facility] [address]    -  Primary ICD-10-CM: F11.20 ICD-9-CM: 304.00 Change from Suboxone to [facility] Encounter for long-term (current) use of high-risk medication     ICD-10-CM: Z79.899 ICD-9-CM: V58.69 Insomnia, unspecified type     ICD-10-CM: G47.00 ICD-9-CM: 780.52 Appetite loss     ICD-10-CM: R63.0 ICD-9-CM: 783.0 Cocaine use     ICD-10-CM: F14.90 ICD-9-CM: 305.60 Pt again adamantly denies any use, states it must be something related to her Son and his girlfriend. Vitals BP Pulse Temp Resp Height(growth percentile) Weight(growth percentile) 130/69 (BP 1 Location: Right arm, BP Patient Position: Sitting) 69 97.6 °F (36.4 °C) (Oral) 14 5' 4\" (1.626 m) 162 lb (73.5 kg) BMI OB Status Smoking Status 27.81 kg/m2 Hysterectomy Never Smoker Vitals History BMI and BSA Data Body Mass Index Body Surface Area  
 27.81 kg/m 2 1.82 m 2 Preferred Pharmacy Pharmacy Name Phone [id number] [phone] Your Updated Medication List  
  
   
This list is accurate as of 9/24/18 12:16 PM.  Always use your most recent med list.  
  
  
  
  
 buprenorphine-naloxone 2.9-0.71 mg Subl Commonly known as:  ZUBSOLV  
1 tab SL bid  Indications: Opioid Dependence, Opioid Withdrawal Symptoms  
  
 busPIRone 15 mg tablet Commonly known as:  BUSPAR Take 15 mg by mouth three (3) times daily. Indications: GENERALIZED ANXIETY DISORDER  
  
 CYMBALTA PO Take 60 mg by mouth daily. FOSAMAX 70 mg tablet Generic drug:  alendronate Take  by mouth Every Saturday. hydrOXYzine pamoate 25 mg capsule Commonly known as:  VISTARIL Take 25 mg by mouth three (3) times daily as needed for Itching. mirtazapine 30 mg disintegrating tablet Commonly known as:  REMERON SOL-TAB Take 1 Tab by mouth nightly. Indications: insomnia  
  
 multivitamin, tx-iron-ca-min 9 mg iron-400 mcg Tab tablet Commonly known as:  THERA-M w/ IRON Take 1 Tab by mouth daily. naloxone 4 mg/actuation nasal spray Commonly known as:  NARCAN  
4 mg by Nasal route once as needed (for opioid overdose) for up to 1 dose. Indications: OPIOID TOXICITY  
  
 rOPINIRole 1 mg tablet Commonly known as:  [place] Huh Take 0.25 mg by mouth nightly. Indications: Restless Legs Syndrome, 3 hours prior to bedtime SENNA PLUS PO Take  by mouth nightly. SYNTHROID PO Take 88 mcg by mouth daily. Indications: hypo  
  
 valACYclovir 1 gram tablet Commonly known as:  VALTREX Take 1 Tab by mouth daily. VITAMIN B-12 500 mcg tablet Generic drug:  cyanocobalamin Take 500 mcg by mouth two (2) times a day. VITAMIN C 500 mg tablet Generic drug:  ascorbic acid (vitamin C) Take  by mouth two (2) times a day. VITAMIN D3 1,000 unit tablet Generic drug:  cholecalciferol Take  by mouth daily. Prescriptions Printed Refills  
 buprenorphine-naloxone (ZUBSOLV) 2.9-0.71 mg subl 0 Si tab SL bid  Indications: Opioid Dependence, Opioid Withdrawal Symptoms Class: Print Prescriptions Sent to Pharmacy Refills  
 mirtazapine (REMERON SOL-TAB) 30 mg disintegrating tablet 1 Sig: Take 1 Tab by mouth nightly. Indications: insomnia Class: Normal  
 Pharmacy: [id number] [place] [address], [place]  Ph #: [phone] Route: Oral  
  
We Performed the Following AMB POC DRUG SCREEN () [ HCP] DRUG SCREEN [[id number] Custom] Follow-up Instructions Return in about 1 week (around 10/1/2018) for medication re-evaluation, evaluation for further weaning of medication. Introducing [facility]! [employer] introduces tribalX patient portal. Now you can access parts of your medical record, email your doctor's office, and request medication refills online. 1. In your internet browser, go to [email or web address] 2. Click on the First Time User? Click Here link in the Sign In box. You will see the New Member Sign Up page. 3. Enter your tribalX Access Code exactly as it appears below. You will not need to use this code after youve completed the sign-up process. If you do not sign up before the expiration date, you must request a new code. · tribalX Access Code: [email or web address] Expires: 2018 11:46 AM 
 
4. Enter the last four digits of your Social Security Number (xxxx) and Date of Birth (mm/dd/yyyy) as indicated and click Submit.  You will be taken to the next sign-up page. 5. Create a Naked ID. This will be your Naked login ID and cannot be changed, so think of one that is secure and easy to remember. 6. Create a Naked password. You can change your password at any time. 7. Enter your Password Reset Question and Answer. This can be used at a later time if you forget your password. 8. Enter your e-mail address. You will receive e-mail notification when new information is available in [address]. 9. Click Sign Up. You can now view and download portions of your medical record. 10. Click the Download Summary menu link to download a portable copy of your medical information. If you have questions, please visit the Frequently Asked Questions section of the Naked website. Remember, Naked is NOT to be used for urgent needs. For medical emergencies, dial 911. Now available from your iPhone and Android! Please provide this summary of care documentation to your next provider. Your primary care clinician is listed as Brayan Chan. If you have any questions after today's visit, please call [phone].

## 2018-09-24 NOTE — PROGRESS NOTES
Today's Date:  2018 Patient:  Belkys Ruiz Patient :  1950 Subjective: Chief Complaint Patient presents with  Leg Pain  
  bilateral  
 Foot Pain  
  bilateral  
 
   
Number of visit(s): 2 HPI: Belkys Ruiz is a 78 y.o.  female who presents for scheduled f/u. States she was unable to fill her SBX Rx, has her insurance covers a different formulation. She states she attempt to reach the clinic on several occasions, but was unsuccessful. Had been taken percocet from her Podiatrist. Ran out 4 days ago, and is presently experiencing withdrawal symptom manifested by runny nose, myalgias, loose stool. ROS:  
 
General: positive for - withdrawal symptoms HEENT: positive for nasal congestion Resp: negative for - cough, sputum production,shortness of breath, or wheezing CV: negative for - chest pain, palpitations GI: positive for - loose stools Neuro: negative for - confusion, seizures or weakness Derm: negative for - rash or skin lesion changes Psych: negative for - cravings, anxiety, depression, irritability or mood swings,suicidal, or homicidal ideation Past Medical History:  
Diagnosis Date  Arthritis   
 osteoporosis  Fibromyalgia  Headache   
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   
 hep c resolved  Hypothyroid   
 hypo  Peripheral neuropathy  Psychiatric disorder   
 anxiety depression  Restless leg syndrome Past Surgical History:  
Procedure Laterality Date  HX HYSTERECTOMY  HX ORTHOPAEDIC  2004  
 cervical fusion  HX ORTHOPAEDIC    
 bilateral carpal tunnel  HX OTHER SURGICAL    
 liver biopsy  HX TONSILLECTOMY  VASCULAR SURGERY PROCEDURE UNLIST    
 varicose veins ablation Current Outpatient Meds and Allergies Current Outpatient Prescriptions Medication Sig Dispense Refill  buprenorphine-naloxone (ZUBSOLV) 2.9-0.71 mg subl 1 tab SL bid  Indications: Opioid Dependence, Opioid Withdrawal Symptoms 14 Tab 0  
 mirtazapine (REMERON SOL-TAB) 30 mg disintegrating tablet Take 1 Tab by mouth nightly. Indications: insomnia 30 Tab 1  
 hydrOXYzine pamoate (VISTARIL) 25 mg capsule Take 25 mg by mouth three (3) times daily as needed for Itching.  naloxone 4 mg/actuation spry 4 mg by Nasal route once as needed (for opioid overdose) for up to 1 dose. Indications: OPIOID TOXICITY 1 Package 0  
 alendronate (FOSAMAX) 70 mg tablet Take  by mouth Every Saturday.  multivitamin, tx-iron-ca-min (THERA-M W/ IRON) 9 mg iron-400 mcg tab tablet Take 1 Tab by mouth daily.  cyanocobalamin (VITAMIN B-12) 500 mcg tablet Take 500 mcg by mouth two (2) times a day.  ascorbic acid, vitamin C, (VITAMIN C) 500 mg tablet Take  by mouth two (2) times a day.  cholecalciferol (VITAMIN D3) 1,000 unit tablet Take  by mouth daily.  SENNOSIDES/DOCUSATE SODIUM (SENNA PLUS PO) Take  by mouth nightly.  rOPINIRole (REQUIP) 1 mg tablet Take 0.25 mg by mouth nightly. Indications: Restless Legs Syndrome, 3 hours prior to bedtime  valACYclovir (VALTREX) 1 g tablet Take 1 Tab by mouth daily. (Patient taking differently: Take 1,000 mg by mouth every eight (8) hours as needed. Indications: GENITAL HERPES SIMPLEX) 30 Tab 2  
 LEVOTHYROXINE SODIUM (SYNTHROID PO) Take 88 mcg by mouth daily. Indications: hypo  DULOXETINE HCL (CYMBALTA PO) Take 60 mg by mouth daily.  busPIRone (BUSPAR) 15 mg tablet Take 15 mg by mouth three (3) times daily. Indications: GENERALIZED ANXIETY DISORDER Allergies Allergen Reactions  Incivek [Telaprevir] Anaphylaxis  Demerol [Meperidine] Itching and Swelling Objective:    
 
VS:   
Vitals:  
 09/24/18 1101 BP: 130/69 Pulse: 69 Resp: 14 Temp: 97.6 °F (36.4 °C) TempSrc: Oral  
Weight: 73.5 kg (162 lb) Height: 5' 4\" (1.626 m) PainSc:   6 PainLoc: Leg Physical Exam 
 
GENERAL: W/D, W/N, appears uncomfortable. APPEARANCE: Well groomed, Appropriately Dressed. ATTITUDE: Pleasant, Cooperative. SPEECH: Normal Rate, Clear. AFFECT: Appropriate,Worried. MOOD: Euthymic. Results for orders placed or performed in visit on 09/24/18 AMB POC DRUG SCREEN () Result Value Ref Range ALCOHOL UR POC AMPHETAMINES UR POC Negative CARISOPRODOL UR POC    
 COCAINE UR POC Presumptive Positive FENTANYL UR POC    
 MDMA/ECSTASY UR POC METHADONE UR POC Negative METHAMPHETAMINE UR POC METHYLPHENIDATE UR POC    
 OPIATES UR POC Presumptive Positive OXYCODONE UR POC Presumptive Positive PHENCYCLIDINE UR POC PROPOXYPHENE UR POC    
 TRAMADOL UR POC    
 TRICYCLICS UR POC BARBITURATES UR POC BENZODIAZEPINES UR POC Negative CANNABINOIDS UR POC Negative BUPRENORPHINE UR POC Negative Assessment/Plan & Orders:    
 
Assessed from Diagnoses and all orders for this visit: 
 
1. Uncomplicated opioid dependence ([facility] [address] Comments: 
Change from Suboxone to [facility] Orders: 
-     buprenorphine-naloxone (ZUBSOLV) 2.9-0.71 mg subl; 1 tab SL bid  Indications: Opioid Dependence, Opioid Withdrawal Symptoms 2. Encounter for long-term (current) use of high-risk medication 
-     DRUG SCREEN 
-     AMB POC DRUG SCREEN () -     buprenorphine-naloxone (ZUBSOLV) 2.9-0.71 mg subl; 1 tab SL bid  Indications: Opioid Dependence, Opioid Withdrawal Symptoms 3. Insomnia, unspecified type 
-     mirtazapine (REMERON SOL-TAB) 30 mg disintegrating tablet; Take 1 Tab by mouth nightly. Indications: insomnia 4. Appetite loss 
-     mirtazapine (REMERON SOL-TAB) 30 mg disintegrating tablet; Take 1 Tab by mouth nightly. Indications: insomnia 5. Cocaine use Comments: 
Pt again adamantly denies any use, states it must be something related to her Son and his girlfriend. I have explained to Pt that if she continues to test positive for cocaine she will have to be discharged from the program. 
Verified patient meets diagnostic criteria for opioid dependence I have reviewed this patient's report generated by the [place] which does not demonstrate aberrancies and inconsistencies with regard to the historical prescribing of controlled medications to this patient by other providers. Reviewed with patient the treatment plan, including assessing whether patient is receiving appropriate psychosocial support. Reviewed goals of treatment plan, and limitations of treatment plan, to include the potential for side effects from medications and procedures. If side effects occur, it is the responsibility of the patient to inform the clinic so that a change in the treatment plan can be made in a safe manner. Encouraged patient to continue compliance with medication as well as other aspects of the program. 
 
The patient is advised that stopping prescribed medication may cause an increase in symptoms and possible medication withdrawal symptoms. The patient is informed an emergency room evaluation may be necessary if this occurs. I spent 25 minutes with the patient in face-to-face consultation, of which greater than 50% was spent in counseling and coordination of care as described above. Follow-up Disposition: 
Return in about 1 week (around 10/1/2018) for medication re-evaluation, evaluation for further weaning of medication. Patient verbalized understanding and is in agreement with treatment plan as outlined above. All questions answered.  
 
 
 
Tiago Ramirez MD

## 2018-10-01 ENCOUNTER — TELEPHONE (OUTPATIENT)
Dept: PAIN MANAGEMENT | Age: 68
End: 2018-10-01

## 2018-10-01 DIAGNOSIS — F11.20 UNCOMPLICATED OPIOID DEPENDENCE (HCC): ICD-10-CM

## 2018-10-01 DIAGNOSIS — Z79.899 ENCOUNTER FOR LONG-TERM (CURRENT) USE OF HIGH-RISK MEDICATION: ICD-10-CM

## 2018-10-01 NOTE — TELEPHONE ENCOUNTER
Patient identified using two patient identifiers (name and ); patient advised prescriptions are ready for pick-up.

## 2018-10-15 ENCOUNTER — OFFICE VISIT (OUTPATIENT)
Dept: PAIN MANAGEMENT | Age: 68
End: 2018-10-15

## 2018-10-15 VITALS
TEMPERATURE: 97.3 F | SYSTOLIC BLOOD PRESSURE: 154 MMHG | HEIGHT: 64 IN | RESPIRATION RATE: 14 BRPM | WEIGHT: 172 LBS | BODY MASS INDEX: 29.37 KG/M2 | DIASTOLIC BLOOD PRESSURE: 71 MMHG | HEART RATE: 74 BPM

## 2018-10-15 DIAGNOSIS — Z79.899 ENCOUNTER FOR LONG-TERM (CURRENT) USE OF HIGH-RISK MEDICATION: Primary | ICD-10-CM

## 2018-10-15 DIAGNOSIS — F11.20 UNCOMPLICATED OPIOID DEPENDENCE (HCC): ICD-10-CM

## 2018-10-15 DIAGNOSIS — G89.4 CHRONIC PAIN SYNDROME: ICD-10-CM

## 2018-10-15 LAB
ALCOHOL UR POC: NORMAL
AMPHETAMINES UR POC: NEGATIVE
BARBITURATES UR POC: NORMAL
BENZODIAZEPINES UR POC: NEGATIVE
BUPRENORPHINE UR POC: NORMAL
CANNABINOIDS UR POC: NORMAL
CARISOPRODOL UR POC: NORMAL
COCAINE UR POC: NEGATIVE
FENTANYL UR POC: NORMAL
MDMA/ECSTASY UR POC: NORMAL
METHADONE UR POC: NEGATIVE
METHAMPHETAMINE UR POC: NORMAL
METHYLPHENIDATE UR POC: NORMAL
OPIATES UR POC: NEGATIVE
OXYCODONE UR POC: NEGATIVE
PHENCYCLIDINE UR POC: NORMAL
PROPOXYPHENE UR POC: NORMAL
TRAMADOL UR POC: NORMAL
TRICYCLICS UR POC: NORMAL

## 2018-10-15 NOTE — PROGRESS NOTES
Nursing Notes Patient presents to the office today in follow-up. Patient rates her pain at 8/10 on the numerical pain scale. Reviewed medications with counts as follows:   
Rx Date filled Qty Dispensed Pill Count Last Dose Short  
 
zubsolv 2.9-0.71 mg 14 0 today no Last opioid agreement 2/13/18 Last urine drug screen 10/10/18 PHQ over the last two weeks 12/9/2016 PHQ Not Done Active Diagnosis of Depression or Bipolar Disorder Comments: POC UDS was performed in office today Any new labs or imaging since last appointment? NO Have you been to an emergency room (ER) or urgent care clinic since your last visit? NO Have you been hospitalized since your last visit? NO If yes, where, when, and reason for visit? Have you seen or consulted any other health care providers outside of the [address] [place] [address]  since your last visit? YES If yes, where, when, and reason for visit? Ms. Anil Frances has a reminder for a \"due or due soon\" health maintenance. I have asked that she contact her primary care provider for follow-up on this health maintenance.

## 2018-10-15 NOTE — PROGRESS NOTES
Today's Date:  10/15/2018 Patient:  Aditi Zaragoza Patient :  1950 Subjective: Chief Complaint Patient presents with  Foot Pain Number of visit(s): HPI: Aditi Zaragoza is a 80 y.o.  female who presents for scheduled f/u. She is still having significant pain issues in both her feet, posterior legs, and her gluteal region bilaterally. She denies any withdrawal symptoms. She relates her PCP restarted her on Gabapentin a little over 2 weeks ago. Pt is a poor historian and somewhat uncooperative. States she was fine until her pain medication was discontinued. ROS:  
 
General: negative for - withdrawal symptoms HEENT: negative for - headaches, or nasal congestion, oral lesions Resp: negative for - cough, sputum production,shortness of breath, or wheezing CV: negative for - chest pain, palpitations GI: negative for - abdominal pain, constipation, diarrhea or nausea/vomiting MSK: joint pain and myalgias essentially unchanged. Neuro: negative for - confusion, seizures or weakness Derm: negative for - rash or skin lesion changes Psych: negative for - cravings, anxiety, depression, irritability or mood swings,suicidal, or homicidal ideation Past Medical History:  
Diagnosis Date  Arthritis   
 osteoporosis  Fibromyalgia  Headache   
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   
 hep c resolved  Hypothyroid   
 hypo  Peripheral neuropathy  Psychiatric disorder   
 anxiety depression  Restless leg syndrome Past Surgical History:  
Procedure Laterality Date  HX HYSTERECTOMY  HX ORTHOPAEDIC  2004  
 cervical fusion  HX ORTHOPAEDIC    
 bilateral carpal tunnel  HX OTHER SURGICAL    
 liver biopsy  HX TONSILLECTOMY  VASCULAR SURGERY PROCEDURE UNLIST  2013  
 varicose veins ablation Luc Arenas MD 
[address] [place] [address] [address]  [facility] [address] Current Outpatient Meds and Allergies Current Outpatient Prescriptions Medication Sig Dispense Refill  buprenorphine-naloxone (ZUBSOLV) 2.9-0.71 mg subl 1 tab SL bid  Indications: Opioid Dependence, Opioid Withdrawal Symptoms 56 Tab 0  
 mirtazapine (REMERON SOL-TAB) 30 mg disintegrating tablet Take 1 Tab by mouth nightly. Indications: insomnia 30 Tab 1  
 hydrOXYzine pamoate (VISTARIL) 25 mg capsule Take 25 mg by mouth three (3) times daily as needed for Itching.  naloxone 4 mg/actuation spry 4 mg by Nasal route once as needed (for opioid overdose) for up to 1 dose. Indications: OPIOID TOXICITY 1 Package 0  
 alendronate (FOSAMAX) 70 mg tablet Take  by mouth Every Saturday.  multivitamin, tx-iron-ca-min (THERA-M W/ IRON) 9 mg iron-400 mcg tab tablet Take 1 Tab by mouth daily.  cyanocobalamin (VITAMIN B-12) 500 mcg tablet Take 500 mcg by mouth two (2) times a day.  ascorbic acid, vitamin C, (VITAMIN C) 500 mg tablet Take  by mouth two (2) times a day.  cholecalciferol (VITAMIN D3) 1,000 unit tablet Take  by mouth daily.  SENNOSIDES/DOCUSATE SODIUM (SENNA PLUS PO) Take  by mouth nightly.  rOPINIRole (REQUIP) 1 mg tablet Take 0.25 mg by mouth nightly. Indications: Restless Legs Syndrome, 3 hours prior to bedtime  valACYclovir (VALTREX) 1 g tablet Take 1 Tab by mouth daily. (Patient taking differently: Take 1,000 mg by mouth every eight (8) hours as needed. Indications: GENITAL HERPES SIMPLEX) 30 Tab 2  
 LEVOTHYROXINE SODIUM (SYNTHROID PO) Take 88 mcg by mouth daily. Indications: hypo  DULOXETINE HCL (CYMBALTA PO) Take 60 mg by mouth daily.  busPIRone (BUSPAR) 15 mg tablet Take 15 mg by mouth three (3) times daily. Indications: GENERALIZED ANXIETY DISORDER Allergies Allergen Reactions  Incivek [Telaprevir] Anaphylaxis  Demerol [Meperidine] Itching and Swelling Objective:    
 
VS:   
Vitals:  
 10/15/18 1141 BP: 154/71 Pulse: 74 Resp: 14 Temp: 97.3 °F (36.3 °C) TempSrc: Oral  
Weight: 78 kg (172 lb) Height: 5' 4\" (1.626 m) PainSc:   8 PainLoc: Foot Physical Exam 
 
GENERAL: W/D, W/N, in no acute distress. . 
ATTITUDE:Uncooperative, Hostile,frequent use of expletives under her breath . SPEECH: Normal Rate, Clear, Pressured. AFFECT: Inappropriate, Labile. MOOD:  Dysthymic, Irritable. Results for orders placed or performed in visit on 10/15/18 AMB POC DRUG SCREEN () Result Value Ref Range ALCOHOL UR POC AMPHETAMINES UR POC Negative CARISOPRODOL UR POC    
 COCAINE UR POC Negative FENTANYL UR POC    
 MDMA/ECSTASY UR POC METHADONE UR POC Negative METHAMPHETAMINE UR POC METHYLPHENIDATE UR POC    
 OPIATES UR POC Negative OXYCODONE UR POC Negative PHENCYCLIDINE UR POC PROPOXYPHENE UR POC    
 TRAMADOL UR POC    
 TRICYCLICS UR POC BARBITURATES UR POC BENZODIAZEPINES UR POC Negative CANNABINOIDS UR POC Presumptive Positive BUPRENORPHINE UR POC Presumptive Positive POC UDS: Pos Bup,  THC Assessment/Plan & Orders:    
 
Assessed from Diagnoses and all orders for this visit: 1. Encounter for long-term (current) use of high-risk medication 
-     DRUG SCREEN 
-     AMB POC DRUG SCREEN () -     HEPATIC FUNCTION PANEL; Future -     buprenorphine-naloxone (ZUBSOLV) 2.9-0.71 mg subl; 1 tab SL bid  Indications: Opioid Dependence, Opioid Withdrawal Symptoms 2. Uncomplicated opioid dependence ([facility] [address] 
-     HEPATIC FUNCTION PANEL; Future -     buprenorphine-naloxone (ZUBSOLV) 2.9-0.71 mg subl; 1 tab SL bid  Indications: Opioid Dependence, Opioid Withdrawal Symptoms 3. Chronic pain syndrome Comments: Will schedule an appointment with Dr. Bridger Wray for evaluation of options to treat her pain. Recommend increasing her Gabapentin to 600 mg tid. Orders: 
-     REFERRAL TO PAIN MANAGEMENT 4. Uncomplicated opioid dependence ([address] Comments: 
Change from Suboxone to [facility] Orders: 
-     HEPATIC FUNCTION PANEL; Future -     buprenorphine-naloxone (ZUBSOLV) 2.9-0.71 mg subl; 1 tab SL bid  Indications: Opioid Dependence, Opioid Withdrawal Symptoms Pt extremely recent to treatment plan. I am concerned that compliance will be an issue going forward. Verified patient meets diagnostic criteria for opioid dependence I have reviewed this patient's report generated by the [place] which does not demonstrate aberrancies and inconsistencies with regard to the historical prescribing of controlled medications to this patient by other providers. Reviewed with patient the treatment plan, including assessing whether patient is receiving appropriate psychosocial support. Reviewed goals of treatment plan, and limitations of treatment plan, to include the potential for side effects from medications and procedures. If side effects occur, it is the responsibility of the patient to inform the clinic so that a change in the treatment plan can be made in a safe manner. Encouraged patient to continue compliance with medication as well as other aspects of the program. 
 
The patient is advised that stopping prescribed medication may cause an increase in symptoms and possible medication withdrawal symptoms. The patient is informed an emergency room evaluation may be necessary if this occurs. I spent 25 minutes with the patient in face-to-face consultation, of which greater than 50% was spent in counseling and coordination of care as described above. Follow-up Disposition: 
Return in about 4 weeks (around 11/12/2018) for medication re-evaluation. Patient verbalized understanding and is in agreement with treatment plan as outlined above. All questions answered.  
 
 
 
Peña Tyler MD

## 2018-10-15 NOTE — PATIENT INSTRUCTIONS

## 2018-10-15 NOTE — MR AVS SNAPSHOT
[address] [address] 
[phone] Patient: Vero Aguilar MRN: [id number]:[id number] Visit Information Date & Time Provider Department Dept. Phone Encounter #  
 10/15/2018 11:00 AM Rick Borges MD [facility] [phone] Follow-up Instructions Return in about 4 weeks (around 11/12/2018) for medication re-evaluation. Upcoming Health Maintenance Date Due Shingrix Vaccine Age 50> (1 of 2) 9/25/2001 BREAST CANCER SCRN MAMMOGRAM 9/25/2001 GLAUCOMA SCREENING Q2Y 9/25/2016 Bone Densitometry (Dexa) Screening 9/25/2016 Pneumococcal 65+ Low/Medium Risk (1 of 2 - PCV13) 9/25/2016 MEDICARE YEARLY EXAM 3/14/2018 Influenza Age 5 to Adult 8/1/2018 COLONOSCOPY 11/7/2021 DTaP/Tdap/Td series (3 - Td) 12/7/2023 Allergies as of 10/15/2018  Review Complete On: 10/15/2018 By: Susie Arreola Severity Noted Reaction Type Reactions Incivek [Telaprevir] High 02/23/2017    Anaphylaxis Demerol [Meperidine]  08/01/2012    Itching, Swelling Current Immunizations  Never Reviewed No immunizations on file. Not reviewed this visit You Were Diagnosed With   
  
 Codes Comments Encounter for long-term (current) use of high-risk medication    -  Primary ICD-10-CM: D11.181 ICD-9-CM: V58.69 Uncomplicated opioid dependence ([facility] [address]     ICD-10-CM: G70.73 ICD-9-CM: 304.00 Chronic pain syndrome     ICD-10-CM: G89.4 ICD-9-CM: 338.4 Will schedule an appointment with Dr. Shivam Tamez for evaluation of options to treat her pain. Recommend increasing her Gabapentin to 600 mg tid. Uncomplicated opioid dependence ([address]     ICD-10-CM: H37.20 ICD-9-CM: 304.00 Change from Suboxone to [facility] Vitals BP Pulse Temp Resp Height(growth percentile) Weight(growth percentile)  154/71 (BP 1 Location: Right arm, BP Patient Position: Sitting) 74 97.3 °F (36.3 °C) (Oral) 14 5' 4\" (1.626 m) 172 lb (78 kg) BMI OB Status Smoking Status 29.52 kg/m2 Hysterectomy Never Smoker Vitals History BMI and BSA Data Body Mass Index Body Surface Area  
 29.52 kg/m 2 1.88 m 2 Preferred Pharmacy Pharmacy Name Phone [id number] [phone] Your Updated Medication List  
  
   
This list is accurate as of 10/15/18  1:10 PM.  Always use your most recent med list.  
  
  
  
  
 buprenorphine-naloxone 2.9-0.71 mg Subl Commonly known as:  ZUBSOLV  
1 tab SL bid  Indications: Opioid Dependence, Opioid Withdrawal Symptoms  
  
 busPIRone 15 mg tablet Commonly known as:  BUSPAR Take 15 mg by mouth three (3) times daily. Indications: GENERALIZED ANXIETY DISORDER  
  
 CYMBALTA PO Take 60 mg by mouth daily. FOSAMAX 70 mg tablet Generic drug:  alendronate Take  by mouth Every Saturday. hydrOXYzine pamoate 25 mg capsule Commonly known as:  VISTARIL Take 25 mg by mouth three (3) times daily as needed for Itching. mirtazapine 30 mg disintegrating tablet Commonly known as:  REMERON SOL-TAB Take 1 Tab by mouth nightly. Indications: insomnia  
  
 multivitamin, tx-iron-ca-min 9 mg iron-400 mcg Tab tablet Commonly known as:  THERA-M w/ IRON Take 1 Tab by mouth daily. naloxone 4 mg/actuation nasal spray Commonly known as:  NARCAN  
4 mg by Nasal route once as needed (for opioid overdose) for up to 1 dose. Indications: OPIOID TOXICITY  
  
 rOPINIRole 1 mg tablet Commonly known as:  Morene Calico Take 0.25 mg by mouth nightly. Indications: Restless Legs Syndrome, 3 hours prior to bedtime SENNA PLUS PO Take  by mouth nightly. SYNTHROID PO Take 88 mcg by mouth daily. Indications: hypo  
  
 valACYclovir 1 gram tablet Commonly known as:  VALTREX Take 1 Tab by mouth daily. VITAMIN B-12 500 mcg tablet Generic drug:  cyanocobalamin Take 500 mcg by mouth two (2) times a day. VITAMIN C 500 mg tablet Generic drug:  ascorbic acid (vitamin C) Take  by mouth two (2) times a day. VITAMIN D3 1,000 unit tablet Generic drug:  cholecalciferol Take  by mouth daily. Prescriptions Printed Refills  
 buprenorphine-naloxone (ZUBSOLV) 2.9-0.71 mg subl 0 Si tab SL bid  Indications: Opioid Dependence, Opioid Withdrawal Symptoms Class: Print We Performed the Following AMB POC DRUG SCREEN () [ [facility]] DRUG SCREEN [[id number] Custom] REFERRAL TO PAIN MANAGEMENT [[id number] Custom] Follow-up Instructions Return in about 4 weeks (around 2018) for medication re-evaluation. To-Do List   
 11/15/2018 Lab:  HEPATIC FUNCTION PANEL Referral Information Referral ID Referred By Referred To  
  
 [id number] RUSLAN Anton Not Available Visits Status Start Date End Date 1 New Request 10/15/18 10/15/19 If your referral has a status of pending review or denied, additional information will be sent to support the outcome of this decision. Patient Instructions Preventing Falls: Care Instructions Your Care Instructions Getting around your home safely can be a challenge if you have injuries or health problems that make it easy for you to fall. Loose rugs and furniture in walkways are among the dangers for many older people who have problems walking or who have poor eyesight. People who have conditions such as arthritis, osteoporosis, or dementia also have to be careful not to fall. You can make your home safer with a few simple measures. Follow-up care is a key part of your treatment and safety. Be sure to make and go to all appointments, and call your doctor if you are having problems. It's also a good idea to know your test results and keep a list of the medicines you take. How can you care for yourself at home? Taking care of yourself · You may get dizzy if you do not drink enough water. To prevent dehydration, drink plenty of fluids, enough so that your urine is light yellow or clear like water. Choose water and other caffeine-free clear liquids. If you have kidney, heart, or liver disease and have to limit fluids, talk with your doctor before you increase the amount of fluids you drink. · Exercise regularly to improve your strength, muscle tone, and balance. Walk if you can. Swimming may be a good choice if you cannot walk easily. · Have your vision and hearing checked each year or any time you notice a change. If you have trouble seeing and hearing, you might not be able to avoid objects and could lose your balance. · Know the side effects of the medicines you take. Ask your doctor or pharmacist whether the medicines you take can affect your balance. Sleeping pills or sedatives can affect your balance. · Limit the amount of alcohol you drink. Alcohol can impair your balance and other senses. · Ask your doctor whether calluses or corns on your feet need to be removed. If you wear loose-fitting shoes because of calluses or corns, you can lose your balance and fall. · Talk to your doctor if you have numbness in your feet. Preventing falls at home · Remove raised doorway thresholds, throw rugs, and clutter. Repair loose carpet or raised areas in the floor. · Move furniture and electrical cords to keep them out of walking paths. · Use nonskid floor wax, and wipe up spills right away, especially on ceramic tile floors. · If you use a walker or cane, put rubber tips on it. If you use crutches, clean the bottoms of them regularly with an abrasive pad, such as steel wool. · Keep your house well lit, especially Julious Bal, and outside walkways. Use night-lights in areas such as hallways and bathrooms.  Add extra light switches or use remote switches (such as switches that go on or off when you clap your hands) to make it easier to turn lights on if you have to get up during the night. · Install sturdy handrails on stairways. · Move items in your cabinets so that the things you use a lot are on the lower shelves (about waist level). · Keep a cordless phone and a flashlight with new batteries by your bed. If possible, put a phone in each of the main rooms of your house, or carry a cell phone in case you fall and cannot reach a phone. Or, you can wear a device around your neck or wrist. You push a button that sends a signal for help. · Wear low-heeled shoes that fit well and give your feet good support. Use footwear with nonskid soles. Check the heels and soles of your shoes for wear. Repair or replace worn heels or soles. · Do not wear socks without shoes on wood floors. · Walk on the grass when the sidewalks are slippery. If you live in an area that gets snow and ice in the winter, sprinkle salt on slippery steps and sidewalks. Preventing falls in the bath · Install grab bars and nonskid mats inside and outside your shower or tub and near the toilet and sinks. · Use shower chairs and bath benches. · Use a hand-held shower head that will allow you to sit while showering. · Get into a tub or shower by putting the weaker leg in first. Get out of a tub or shower with your strong side first. 
· Repair loose toilet seats and consider installing a raised toilet seat to make getting on and off the toilet easier. · Keep your bathroom door unlocked while you are in the shower. Where can you learn more? Go to http://valerie-rony.info/. Enter [phone] in the search box to learn more about \"Preventing Falls: Care Instructions. \" Current as of: March 16, 2018 Content Version: 11.8 © 1073-9854 [employer].  Care instructions adapted under license by [employer] (which disclaims liability or warranty for this information). If you have questions about a medical condition or this instruction, always ask your healthcare professional. [address] any warranty or liability for your use of this information. Introducing [facility] SERVICES! [employer] introduces ProtoStar patient portal. Now you can access parts of your medical record, email your doctor's office, and request medication refills online. 1. In your internet browser, go to [email or web address] 2. Click on the First Time User? Click Here link in the Sign In box. You will see the New Member Sign Up page. 3. Enter your ProtoStar Access Code exactly as it appears below. You will not need to use this code after youve completed the sign-up process. If you do not sign up before the expiration date, you must request a new code. · ProtoStar Access Code: [email or web address] Expires: 11/28/2018 11:46 AM 
 
4. Enter the last four digits of your Social Security Number (xxxx) and Date of Birth (mm/dd/yyyy) as indicated and click Submit. You will be taken to the next sign-up page. 5. Create a ProtoStar ID. This will be your ProtoStar login ID and cannot be changed, so think of one that is secure and easy to remember. 6. Create a ProtoStar password. You can change your password at any time. 7. Enter your Password Reset Question and Answer. This can be used at a later time if you forget your password. 8. Enter your e-mail address. You will receive e-mail notification when new information is available in [address] 9. Click Sign Up. You can now view and download portions of your medical record. 10. Click the Download Summary menu link to download a portable copy of your medical information. If you have questions, please visit the Frequently Asked Questions section of the ProtoStar website. Remember, ProtoStar is NOT to be used for urgent needs. For medical emergencies, dial 911. Now available from your iPhone and Android! Please provide this summary of care documentation to your next provider. Your primary care clinician is listed as Scott Pablo. If you have any questions after today's visit, please call [phone].

## 2018-11-18 DIAGNOSIS — G47.00 INSOMNIA, UNSPECIFIED TYPE: ICD-10-CM

## 2018-11-18 DIAGNOSIS — R63.0 APPETITE LOSS: ICD-10-CM

## 2018-11-19 RX ORDER — MIRTAZAPINE 30 MG/1
TABLET, ORALLY DISINTEGRATING ORAL
Qty: 30 TAB | Refills: 1 | Status: SHIPPED | OUTPATIENT
Start: 2018-11-19

## 2020-12-11 NOTE — DISCHARGE INSTRUCTIONS
12/11/20 1324   Final Note   Assessment Type Final Discharge Note   Anticipated Discharge Disposition Home   Hospital Follow Up  Appt(s) scheduled? Yes   Discharge plans and expectations educations in teach back method with documentation complete? Yes   Right Care Referral Info   Post Acute Recommendation No Care   Post-Acute Status   Post-Acute Authorization Other   Other Status No Post-Acute Service Needs   Pts nurse Albaro notified that the pt can d/c from CM standpoint   Dr. Crisostomo Late Operative Instructions: Cervical Fusion    *YOU MUST AVOID SMOKING OR BEING AROUND ANYONE WHO SMOKES. AVOID ALL PRODUCTS THAT CONTAIN NICOTINE. DO NOT TAKE IBUPROFEN OR ANTI--INFLAMMATORIES, AS THESE MAY ALTER THE HEALING OF THE FUSION. Diet:   1. Begin with liquids and light foods such as jell-o or soups  2. Advance as tolerated to your regular diet if not nauseated. 3.  Swallowing difficulties may be experienced up to 2 weeks post-operatively. Modify food thickness as necessary. You may also obtain over-the-counter chloraseptic spray. Medications:  1. Strong oral narcotic pain medications have been prescribed for the first few days. Use only as directed. No pain medication is capable of taking away all the pain. Taking your pills at regular intervals will give you the best chance of having less pain. 2. If you need a refill PLEASE PLAN AHEAD. Call our office during regular hours (8-5). 3. Do not combine with alcoholic beverages. 4. Be careful as you walk, climb stairs or drive as mild dizziness is not unusual.  5. Do not take medications that have not been prescribed by your surgeon. 6. You may switch to over the counter pain medication of your choice as you become more comfortable. Activity and Restrictions:  1. You should not drive until you are clear by your surgeon at your post-operative visit. 2.  In regards to your cervical collar, you MUST wear this at all times until your first follow-up appointment. 3.  You should wear the collar even when sleeping. 4.  It can be removed for short periods of time as long as you are keeping your head centered over the shoulders without rotating or looking up or down. 5. You may take short walks inside and outside of your home and climb stairs. 6. You are to avoid work, housework, yard work, snow shoveling, lifting of more than a few pounds, overhead work or strenuous activity.   7. Avoid any excessive stretching or range-of-motion of the neck. Non-painful range-of-motion of the neck is allowed  8. Follow-up with Dr. Chase Soria in 7-10 days. DRIVIN. You should not drive until after your follow-up appointment. 2.  You can be in a vehicle for short distances, but if you travel any long distance, please stop about every 30 minutes and walk/stretch. 3.  You should NEVER drive while taking narcotic medication. BATHING and INCISION CARE:  1. The incision may be tender to touch or feel numb: this is normal.   2.  Keep the incision clean and dry. Do not get the incision wet for 5 days. The incision will be closed with sutures under the skin and the skin will be glued. 3.  Do not apply any lotions, ointments or oils on the incision. 4.  If you notice any excessive swelling, redness, or persistent drainage around the incision, notify the office immediately. RETURN TO WORK :  1. The decision to return to work will be determined on an individual basis. 2.  Many people who have a strenuous job (construction, heavy labor, etc) may need to be off work for up to 8 weeks. 3.  If you need a work note, please let us know as soon as possible, and not the same day you are planning to return to work. NUTRITION :  1.  Good nutrition is an essential part of healing. 2.  You should eat a balanced diet each day, including fruits, vegetables, dairy products and protein. 3.  Remember to drink plenty of water. 4.  If you have not had a bowel movement within 3 days of surgery, you will need to use a laxative or suppository that can be obtained over-the-counter at your local pharmacy. BONE STIMULATOR:  1. A spinal bone stimulator may be prescribed for you under certain situations. 2.  A nurse will call you if one has been requested and discuss its use for approximately 4-6 months post-op every day. 3.  This device assists in bone healing and fusion.     CALL THE OFFICE:   If you have severe pain unrelieved by the medications, new numbness or tingling in your arms;   If you have a fever of 101.0°F or greater;    If you notice excessive swelling, redness, or persistent drainage from the incision or IV site; [facility] office number is [phone] from 8:00am to 5:00pm Monday through Friday. After 5:00pm, on weekends, or holidays, please leave a message with our answering service and the doctor on-call will get back to you shortly.       Patient armband removed and shredded

## (undated) DEVICE — NDL SPNE QNCKE 18GX3.5IN LF --

## (undated) DEVICE — Device

## (undated) DEVICE — DRAIN SURG L49IN DIA3/32IN 10IN H SIL W/O TRCR END PERF

## (undated) DEVICE — SINGLE PORT MANIFOLD: Brand: NEPTUNE 2

## (undated) DEVICE — SHEET,DRAPE,70X85,STERILE: Brand: MEDLINE

## (undated) DEVICE — SUTURE PROL SZ 3-0 L18IN NONABSORBABLE BLU L19MM PC-5 3/8 8632G

## (undated) DEVICE — STERILE POLYISOPRENE POWDER-FREE SURGICAL GLOVES: Brand: PROTEXIS

## (undated) DEVICE — LIMB HOLDER, WRIST/ANKLE: Brand: DEROYAL

## (undated) DEVICE — SOL IRRIGATION INJ NACL 0.9% 500ML BTL

## (undated) DEVICE — DRAPE,THYROID,SOFT,STERILE: Brand: MEDLINE

## (undated) DEVICE — HALTER TRACTION HD W/ TRI COTTON LINING FOAM LTX

## (undated) DEVICE — DERMABOND SKIN ADH 0.7ML -- DERMABOND ADVANCED 12/BX

## (undated) DEVICE — KENDALL SCD EXPRESS SLEEVES, KNEE LENGTH, MEDIUM: Brand: KENDALL SCD

## (undated) DEVICE — PREP SKN PREVAIL 40ML APPL --

## (undated) DEVICE — REM POLYHESIVE ADULT PATIENT RETURN ELECTRODE: Brand: VALLEYLAB

## (undated) DEVICE — SKIN MARKER,REGULAR TIP WITH RULER AND LABELS: Brand: DEVON

## (undated) DEVICE — 3.0MM PRECISION NEURO (MATCH HEAD)

## (undated) DEVICE — BASIC SINGLE BASIN 1-LF: Brand: MEDLINE INDUSTRIES, INC.

## (undated) DEVICE — ROUND DISSECTORS: Brand: DEROYAL

## (undated) DEVICE — SUTURE VCRL + SZ 2-0 L18IN ABSRB VLT CT-2 1/2 CIR TAPERCUT VCP726D

## (undated) DEVICE — INSULATED BLADE ELECTRODE: Brand: EDGE

## (undated) DEVICE — NEEDLE HYPO 25GA L1.5IN BLU POLYPR HUB S STL REG BVL STR

## (undated) DEVICE — SHEET,DRAPE,40X58,STERILE: Brand: MEDLINE

## (undated) DEVICE — 1010 S-DRAPE TOWEL DRAPE 10/BX: Brand: STERI-DRAPE™

## (undated) DEVICE — 3M™ TEGADERM™ TRANSPARENT FILM DRESSING FRAME STYLE, 1626W, 4 IN X 4-3/4 IN (10 CM X 12 CM), 50/CT 4CT/CASE: Brand: 3M™ TEGADERM™